# Patient Record
Sex: FEMALE | Race: WHITE | NOT HISPANIC OR LATINO | Employment: FULL TIME | RURAL
[De-identification: names, ages, dates, MRNs, and addresses within clinical notes are randomized per-mention and may not be internally consistent; named-entity substitution may affect disease eponyms.]

---

## 2018-06-26 ENCOUNTER — HISTORICAL (OUTPATIENT)
Dept: ADMINISTRATIVE | Facility: HOSPITAL | Age: 58
End: 2018-06-26

## 2018-06-29 LAB
LAB AP CLINICAL INFORMATION: NORMAL
LAB AP COMMENTS: NORMAL
LAB AP DIAGNOSIS - HISTORICAL: NORMAL
LAB AP GROSS PATHOLOGY - HISTORICAL: NORMAL
LAB AP SPECIMEN SUBMITTED - HISTORICAL: NORMAL

## 2018-09-28 ENCOUNTER — LAB VISIT (OUTPATIENT)
Dept: LAB | Facility: HOSPITAL | Age: 58
End: 2018-09-28
Attending: FAMILY MEDICINE
Payer: COMMERCIAL

## 2018-09-28 ENCOUNTER — OFFICE VISIT (OUTPATIENT)
Dept: INTERNAL MEDICINE | Facility: CLINIC | Age: 58
End: 2018-09-28
Payer: COMMERCIAL

## 2018-09-28 VITALS
HEIGHT: 63 IN | HEART RATE: 64 BPM | BODY MASS INDEX: 21.71 KG/M2 | OXYGEN SATURATION: 99 % | TEMPERATURE: 97 F | DIASTOLIC BLOOD PRESSURE: 64 MMHG | RESPIRATION RATE: 18 BRPM | WEIGHT: 122.56 LBS | SYSTOLIC BLOOD PRESSURE: 130 MMHG

## 2018-09-28 DIAGNOSIS — K57.30 DIVERTICULOSIS OF LARGE INTESTINE WITHOUT HEMORRHAGE: ICD-10-CM

## 2018-09-28 DIAGNOSIS — K57.30 DIVERTICULOSIS OF LARGE INTESTINE WITHOUT HEMORRHAGE: Primary | ICD-10-CM

## 2018-09-28 DIAGNOSIS — R11.0 NAUSEA: ICD-10-CM

## 2018-09-28 PROBLEM — R10.30 LOWER ABDOMINAL PAIN: Status: ACTIVE | Noted: 2018-09-28

## 2018-09-28 PROBLEM — R30.0 DYSURIA: Status: ACTIVE | Noted: 2018-09-28

## 2018-09-28 PROBLEM — R10.30 LOWER ABDOMINAL PAIN: Status: RESOLVED | Noted: 2018-09-28 | Resolved: 2018-09-28

## 2018-09-28 LAB
ALBUMIN SERPL BCP-MCNC: 3.6 G/DL
ALP SERPL-CCNC: 44 U/L
ALT SERPL W/O P-5'-P-CCNC: 22 U/L
ANION GAP SERPL CALC-SCNC: 9 MMOL/L
AST SERPL-CCNC: 17 U/L
BASOPHILS # BLD AUTO: 0.02 K/UL
BASOPHILS NFR BLD: 0.2 %
BILIRUB SERPL-MCNC: 0.5 MG/DL
BILIRUB SERPL-MCNC: ABNORMAL MG/DL
BLOOD URINE, POC: ABNORMAL
BUN SERPL-MCNC: 14 MG/DL
CALCIUM SERPL-MCNC: 9.4 MG/DL
CHLORIDE SERPL-SCNC: 105 MMOL/L
CO2 SERPL-SCNC: 27 MMOL/L
COLOR, POC UA: ABNORMAL
CREAT SERPL-MCNC: 0.7 MG/DL
DIFFERENTIAL METHOD: ABNORMAL
EOSINOPHIL # BLD AUTO: 0.1 K/UL
EOSINOPHIL NFR BLD: 0.7 %
ERYTHROCYTE [DISTWIDTH] IN BLOOD BY AUTOMATED COUNT: 11.7 %
EST. GFR  (AFRICAN AMERICAN): >60 ML/MIN/1.73 M^2
EST. GFR  (NON AFRICAN AMERICAN): >60 ML/MIN/1.73 M^2
GLUCOSE SERPL-MCNC: 111 MG/DL
GLUCOSE UR QL STRIP: NORMAL
HCT VFR BLD AUTO: 39.8 %
HGB BLD-MCNC: 13.9 G/DL
KETONES UR QL STRIP: ABNORMAL
LEUKOCYTE ESTERASE URINE, POC: ABNORMAL
LYMPHOCYTES # BLD AUTO: 1 K/UL
LYMPHOCYTES NFR BLD: 10.6 %
MCH RBC QN AUTO: 32.1 PG
MCHC RBC AUTO-ENTMCNC: 34.9 G/DL
MCV RBC AUTO: 92 FL
MONOCYTES # BLD AUTO: 0.7 K/UL
MONOCYTES NFR BLD: 7.7 %
NEUTROPHILS # BLD AUTO: 7.7 K/UL
NEUTROPHILS NFR BLD: 80.7 %
NITRITE, POC UA: ABNORMAL
PH, POC UA: 6
PLATELET # BLD AUTO: 233 K/UL
PMV BLD AUTO: 9.9 FL
POTASSIUM SERPL-SCNC: 3.9 MMOL/L
PROT SERPL-MCNC: 7 G/DL
PROTEIN, POC: ABNORMAL
RBC # BLD AUTO: 4.33 M/UL
SODIUM SERPL-SCNC: 141 MMOL/L
SPECIFIC GRAVITY, POC UA: 1.02
UROBILINOGEN, POC UA: NORMAL
WBC # BLD AUTO: 9.51 K/UL

## 2018-09-28 PROCEDURE — 85025 COMPLETE CBC W/AUTO DIFF WBC: CPT | Mod: PO

## 2018-09-28 PROCEDURE — 81002 URINALYSIS NONAUTO W/O SCOPE: CPT | Mod: S$GLB,,, | Performed by: FAMILY MEDICINE

## 2018-09-28 PROCEDURE — 99204 OFFICE O/P NEW MOD 45 MIN: CPT | Mod: 25,S$GLB,, | Performed by: FAMILY MEDICINE

## 2018-09-28 PROCEDURE — 99999 PR PBB SHADOW E&M-NEW PATIENT-LVL III: CPT | Mod: PBBFAC,,, | Performed by: FAMILY MEDICINE

## 2018-09-28 PROCEDURE — 36415 COLL VENOUS BLD VENIPUNCTURE: CPT | Mod: PO

## 2018-09-28 PROCEDURE — 80053 COMPREHEN METABOLIC PANEL: CPT | Mod: PO

## 2018-09-28 RX ORDER — ESTRADIOL 2 MG/1
TABLET ORAL
COMMUNITY
Start: 2018-08-04 | End: 2023-12-15 | Stop reason: DRUGHIGH

## 2018-09-28 RX ORDER — ONDANSETRON 4 MG/1
4 TABLET, FILM COATED ORAL EVERY 8 HOURS PRN
Qty: 30 TABLET | Refills: 0 | Status: SHIPPED | OUTPATIENT
Start: 2018-09-28 | End: 2023-12-15

## 2018-09-28 RX ORDER — CIPROFLOXACIN 500 MG/1
500 TABLET ORAL EVERY 12 HOURS
Qty: 10 TABLET | Refills: 0 | Status: SHIPPED | OUTPATIENT
Start: 2018-09-28 | End: 2021-12-27 | Stop reason: ALTCHOICE

## 2018-09-28 RX ORDER — ATORVASTATIN CALCIUM 10 MG/1
TABLET, FILM COATED ORAL
COMMUNITY
Start: 2018-07-18

## 2018-09-28 RX ORDER — CETIRIZINE HYDROCHLORIDE 5 MG/1
5 TABLET ORAL DAILY
COMMUNITY

## 2018-09-28 RX ORDER — DICYCLOMINE HYDROCHLORIDE 20 MG/1
20 TABLET ORAL EVERY 6 HOURS
Qty: 120 TABLET | Refills: 0 | Status: SHIPPED | OUTPATIENT
Start: 2018-09-28 | End: 2018-10-28

## 2018-09-28 RX ORDER — MONTELUKAST SODIUM 10 MG/1
TABLET ORAL
COMMUNITY
Start: 2018-07-10

## 2018-09-28 NOTE — ASSESSMENT & PLAN NOTE
Start abx and bentyl.  See handout for info.  F/u next week.  No imaging today, will consider at next visit.

## 2018-09-28 NOTE — PATIENT INSTRUCTIONS

## 2018-10-19 DIAGNOSIS — Z12.39 BREAST CANCER SCREENING: ICD-10-CM

## 2019-07-16 ENCOUNTER — HISTORICAL (OUTPATIENT)
Dept: ADMINISTRATIVE | Facility: HOSPITAL | Age: 59
End: 2019-07-16

## 2019-10-18 ENCOUNTER — TELEPHONE (OUTPATIENT)
Dept: ADMINISTRATIVE | Facility: HOSPITAL | Age: 59
End: 2019-10-18

## 2019-10-18 NOTE — TELEPHONE ENCOUNTER
Contacted patient to schedule annual visit with PCP Dr. Taye Cuellar; patient states that she lives in [place] and she see a PCP closer to her home. Tomasa

## 2019-12-20 DIAGNOSIS — Z12.39 BREAST CANCER SCREENING: ICD-10-CM

## 2020-05-19 ENCOUNTER — TELEPHONE (OUTPATIENT)
Dept: ADMINISTRATIVE | Facility: HOSPITAL | Age: 60
End: 2020-05-19

## 2020-07-21 ENCOUNTER — HISTORICAL (OUTPATIENT)
Dept: ADMINISTRATIVE | Facility: HOSPITAL | Age: 60
End: 2020-07-21

## 2020-07-23 LAB

## 2020-07-30 ENCOUNTER — HISTORICAL (OUTPATIENT)
Dept: ADMINISTRATIVE | Facility: HOSPITAL | Age: 60
End: 2020-07-30

## 2020-08-04 LAB
INSULIN SERPL-ACNC: NORMAL U[IU]/ML
LAB AP CLINICAL INFORMATION: NORMAL
LAB AP COMMENTS: NORMAL
LAB AP DIAGNOSIS - HISTORICAL: NORMAL
LAB AP GROSS PATHOLOGY - HISTORICAL: NORMAL
LAB AP SPECIMEN SUBMITTED - HISTORICAL: NORMAL

## 2021-07-15 NOTE — PROGRESS NOTES
Subjective:       Patient ID: Sunita Pitts is a 57 y.o. female.    Chief Complaint: Urinary Tract Infection and Establish Care    Abdominal Pain   This is a new problem. The current episode started yesterday. The onset quality is gradual. The problem occurs constantly. The problem has been gradually worsening. The pain is located in the LLQ and RLQ. The pain is moderate. Quality: tenderness, sorenes. The abdominal pain does not radiate. Associated symptoms include a fever and nausea. Pertinent negatives include no constipation or vomiting.     Review of Systems   Constitutional: Positive for fever.   HENT: Negative for congestion.    Eyes: Negative for discharge.   Respiratory: Negative for shortness of breath.    Cardiovascular: Negative for chest pain.   Gastrointestinal: Positive for abdominal pain and nausea. Negative for constipation and vomiting.   Genitourinary: Negative for difficulty urinating.   Musculoskeletal: Negative for joint swelling.   Neurological: Negative for dizziness.   Psychiatric/Behavioral: Negative for agitation.       Objective:      Physical Exam   Constitutional: She appears well-developed and well-nourished. She appears distressed.   HENT:   Head: Normocephalic and atraumatic.   Eyes: Conjunctivae are normal. No scleral icterus.   Cardiovascular: Normal rate and regular rhythm.   Pulmonary/Chest: Effort normal and breath sounds normal. No respiratory distress. She has no wheezes.   Abdominal: Soft. Bowel sounds are normal. There is tenderness in the left lower quadrant. There is no rigidity, no rebound and negative Hodge's sign.   Neurological: She is alert.   Skin: Skin is warm and dry. No rash noted. She is not diaphoretic. No erythema. No pallor.   Good turgor   Nursing note and vitals reviewed.      Assessment:       1. Diverticulosis of large intestine without hemorrhage    2. Nausea        Plan:     Problem List Items Addressed This Visit        GI    Diverticulosis of  large intestine - Primary    Current Assessment & Plan     Start abx and bentyl.  See handout for info.  F/u next week.  No imaging today, will consider at next visit.         Relevant Medications    ondansetron (ZOFRAN) 4 MG tablet    ciprofloxacin HCl (CIPRO) 500 MG tablet    dicyclomine (BENTYL) 20 mg tablet    Other Relevant Orders    POCT URINE DIPSTICK WITHOUT MICROSCOPE    CBC auto differential    Comprehensive metabolic panel    Nausea    Relevant Medications    ondansetron (ZOFRAN) 4 MG tablet         I certify as stated above.

## 2021-12-27 ENCOUNTER — OFFICE VISIT (OUTPATIENT)
Dept: FAMILY MEDICINE | Facility: CLINIC | Age: 61
End: 2021-12-27
Payer: COMMERCIAL

## 2021-12-27 VITALS
RESPIRATION RATE: 18 BRPM | HEART RATE: 75 BPM | SYSTOLIC BLOOD PRESSURE: 117 MMHG | HEIGHT: 63 IN | WEIGHT: 128.63 LBS | DIASTOLIC BLOOD PRESSURE: 61 MMHG | OXYGEN SATURATION: 96 % | BODY MASS INDEX: 22.79 KG/M2

## 2021-12-27 DIAGNOSIS — N30.91 CYSTITIS WITH HEMATURIA: Primary | ICD-10-CM

## 2021-12-27 LAB
BILIRUB SERPL-MCNC: ABNORMAL MG/DL
BLOOD URINE, POC: ABNORMAL
COLOR, POC UA: ABNORMAL
GLUCOSE UR QL STRIP: ABNORMAL
KETONES UR QL STRIP: ABNORMAL
LEUKOCYTE ESTERASE URINE, POC: ABNORMAL
NITRITE, POC UA: POSITIVE
PH, POC UA: 5.5
PROTEIN, POC: ABNORMAL
SPECIFIC GRAVITY, POC UA: 1.02
UROBILINOGEN, POC UA: 0.2

## 2021-12-27 PROCEDURE — 96372 THER/PROPH/DIAG INJ SC/IM: CPT | Mod: ,,, | Performed by: NURSE PRACTITIONER

## 2021-12-27 PROCEDURE — 96372 PR INJECTION,THERAP/PROPH/DIAG2ST, IM OR SUBCUT: ICD-10-PCS | Mod: ,,, | Performed by: NURSE PRACTITIONER

## 2021-12-27 PROCEDURE — 87086 CULTURE, URINE: ICD-10-PCS | Mod: ,,, | Performed by: CLINICAL MEDICAL LABORATORY

## 2021-12-27 PROCEDURE — 3078F PR MOST RECENT DIASTOLIC BLOOD PRESSURE < 80 MM HG: ICD-10-PCS | Mod: ,,, | Performed by: NURSE PRACTITIONER

## 2021-12-27 PROCEDURE — 3074F PR MOST RECENT SYSTOLIC BLOOD PRESSURE < 130 MM HG: ICD-10-PCS | Mod: ,,, | Performed by: NURSE PRACTITIONER

## 2021-12-27 PROCEDURE — 3078F DIAST BP <80 MM HG: CPT | Mod: ,,, | Performed by: NURSE PRACTITIONER

## 2021-12-27 PROCEDURE — 87086 URINE CULTURE/COLONY COUNT: CPT | Mod: ,,, | Performed by: CLINICAL MEDICAL LABORATORY

## 2021-12-27 PROCEDURE — 3008F BODY MASS INDEX DOCD: CPT | Mod: ,,, | Performed by: NURSE PRACTITIONER

## 2021-12-27 PROCEDURE — 99213 OFFICE O/P EST LOW 20 MIN: CPT | Mod: 25,,, | Performed by: NURSE PRACTITIONER

## 2021-12-27 PROCEDURE — 81003 POCT URINALYSIS W/O SCOPE: ICD-10-PCS | Mod: QW,,, | Performed by: NURSE PRACTITIONER

## 2021-12-27 PROCEDURE — 3008F PR BODY MASS INDEX (BMI) DOCUMENTED: ICD-10-PCS | Mod: ,,, | Performed by: NURSE PRACTITIONER

## 2021-12-27 PROCEDURE — 81003 URINALYSIS AUTO W/O SCOPE: CPT | Mod: QW,,, | Performed by: NURSE PRACTITIONER

## 2021-12-27 PROCEDURE — 1159F MED LIST DOCD IN RCRD: CPT | Mod: ,,, | Performed by: NURSE PRACTITIONER

## 2021-12-27 PROCEDURE — 3074F SYST BP LT 130 MM HG: CPT | Mod: ,,, | Performed by: NURSE PRACTITIONER

## 2021-12-27 PROCEDURE — 1159F PR MEDICATION LIST DOCUMENTED IN MEDICAL RECORD: ICD-10-PCS | Mod: ,,, | Performed by: NURSE PRACTITIONER

## 2021-12-27 PROCEDURE — 99213 PR OFFICE/OUTPT VISIT, EST, LEVL III, 20-29 MIN: ICD-10-PCS | Mod: 25,,, | Performed by: NURSE PRACTITIONER

## 2021-12-27 RX ORDER — PHENAZOPYRIDINE HYDROCHLORIDE 200 MG/1
200 TABLET, FILM COATED ORAL 3 TIMES DAILY PRN
Qty: 12 TABLET | Refills: 0 | Status: SHIPPED | OUTPATIENT
Start: 2021-12-27 | End: 2022-01-06

## 2021-12-27 RX ORDER — EPINEPHRINE 0.3 MG/.3ML
INJECTION SUBCUTANEOUS
COMMUNITY
Start: 2021-11-22

## 2021-12-27 RX ORDER — CEFTRIAXONE 1 G/1
1 INJECTION, POWDER, FOR SOLUTION INTRAMUSCULAR; INTRAVENOUS
Status: COMPLETED | OUTPATIENT
Start: 2021-12-27 | End: 2021-12-27

## 2021-12-27 RX ORDER — SULFAMETHOXAZOLE AND TRIMETHOPRIM 800; 160 MG/1; MG/1
1 TABLET ORAL 2 TIMES DAILY
Qty: 14 TABLET | Refills: 0 | Status: SHIPPED | OUTPATIENT
Start: 2021-12-27 | End: 2023-12-15

## 2021-12-27 RX ORDER — CEFTRIAXONE 1 G/1
1 INJECTION, POWDER, FOR SOLUTION INTRAMUSCULAR; INTRAVENOUS ONCE
Qty: 1 G | Refills: 0 | Status: SHIPPED | OUTPATIENT
Start: 2021-12-27 | End: 2021-12-27

## 2021-12-27 RX ADMIN — CEFTRIAXONE 1 G: 1 INJECTION, POWDER, FOR SOLUTION INTRAMUSCULAR; INTRAVENOUS at 03:12

## 2021-12-30 ENCOUNTER — TELEPHONE (OUTPATIENT)
Dept: FAMILY MEDICINE | Facility: CLINIC | Age: 61
End: 2021-12-30
Payer: COMMERCIAL

## 2021-12-30 LAB — UA COMPLETE W REFLEX CULTURE PNL UR: NORMAL

## 2021-12-30 RX ORDER — FLUCONAZOLE 150 MG/1
150 TABLET ORAL DAILY
Qty: 1 TABLET | Refills: 0 | Status: SHIPPED | OUTPATIENT
Start: 2021-12-30 | End: 2021-12-31

## 2022-10-04 ENCOUNTER — OFFICE VISIT (OUTPATIENT)
Dept: FAMILY MEDICINE | Facility: CLINIC | Age: 62
End: 2022-10-04
Payer: COMMERCIAL

## 2022-10-04 VITALS
RESPIRATION RATE: 18 BRPM | OXYGEN SATURATION: 97 % | HEIGHT: 63 IN | HEART RATE: 66 BPM | SYSTOLIC BLOOD PRESSURE: 114 MMHG | BODY MASS INDEX: 23.28 KG/M2 | DIASTOLIC BLOOD PRESSURE: 54 MMHG | WEIGHT: 131.38 LBS | TEMPERATURE: 98 F

## 2022-10-04 DIAGNOSIS — J02.9 PHARYNGITIS, UNSPECIFIED ETIOLOGY: Primary | ICD-10-CM

## 2022-10-04 DIAGNOSIS — J06.9 UPPER RESPIRATORY TRACT INFECTION, UNSPECIFIED TYPE: ICD-10-CM

## 2022-10-04 PROBLEM — N30.91 CYSTITIS WITH HEMATURIA: Status: RESOLVED | Noted: 2021-12-27 | Resolved: 2022-10-04

## 2022-10-04 PROBLEM — K57.30 DIVERTICULOSIS OF LARGE INTESTINE: Status: RESOLVED | Noted: 2018-09-28 | Resolved: 2022-10-04

## 2022-10-04 PROBLEM — R11.0 NAUSEA: Status: RESOLVED | Noted: 2018-09-28 | Resolved: 2022-10-04

## 2022-10-04 PROCEDURE — 3074F PR MOST RECENT SYSTOLIC BLOOD PRESSURE < 130 MM HG: ICD-10-PCS | Mod: ,,, | Performed by: NURSE PRACTITIONER

## 2022-10-04 PROCEDURE — 1159F PR MEDICATION LIST DOCUMENTED IN MEDICAL RECORD: ICD-10-PCS | Mod: ,,, | Performed by: NURSE PRACTITIONER

## 2022-10-04 PROCEDURE — 1159F MED LIST DOCD IN RCRD: CPT | Mod: ,,, | Performed by: NURSE PRACTITIONER

## 2022-10-04 PROCEDURE — 3078F DIAST BP <80 MM HG: CPT | Mod: ,,, | Performed by: NURSE PRACTITIONER

## 2022-10-04 PROCEDURE — 3008F PR BODY MASS INDEX (BMI) DOCUMENTED: ICD-10-PCS | Mod: ,,, | Performed by: NURSE PRACTITIONER

## 2022-10-04 PROCEDURE — 96372 PR INJECTION,THERAP/PROPH/DIAG2ST, IM OR SUBCUT: ICD-10-PCS | Mod: ,,, | Performed by: NURSE PRACTITIONER

## 2022-10-04 PROCEDURE — 3008F BODY MASS INDEX DOCD: CPT | Mod: ,,, | Performed by: NURSE PRACTITIONER

## 2022-10-04 PROCEDURE — 3074F SYST BP LT 130 MM HG: CPT | Mod: ,,, | Performed by: NURSE PRACTITIONER

## 2022-10-04 PROCEDURE — 3078F PR MOST RECENT DIASTOLIC BLOOD PRESSURE < 80 MM HG: ICD-10-PCS | Mod: ,,, | Performed by: NURSE PRACTITIONER

## 2022-10-04 PROCEDURE — 96372 THER/PROPH/DIAG INJ SC/IM: CPT | Mod: ,,, | Performed by: NURSE PRACTITIONER

## 2022-10-04 PROCEDURE — 99213 OFFICE O/P EST LOW 20 MIN: CPT | Mod: 25,,, | Performed by: NURSE PRACTITIONER

## 2022-10-04 PROCEDURE — 99213 PR OFFICE/OUTPT VISIT, EST, LEVL III, 20-29 MIN: ICD-10-PCS | Mod: 25,,, | Performed by: NURSE PRACTITIONER

## 2022-10-04 RX ORDER — AMOXICILLIN 875 MG/1
875 TABLET, FILM COATED ORAL EVERY 12 HOURS
Qty: 20 TABLET | Refills: 0 | Status: SHIPPED | OUTPATIENT
Start: 2022-10-04 | End: 2023-12-15

## 2022-10-04 RX ORDER — CEFTRIAXONE 1 G/1
1 INJECTION, POWDER, FOR SOLUTION INTRAMUSCULAR; INTRAVENOUS
Status: COMPLETED | OUTPATIENT
Start: 2022-10-04 | End: 2022-10-04

## 2022-10-04 RX ORDER — DEXAMETHASONE SODIUM PHOSPHATE 4 MG/ML
4 INJECTION, SOLUTION INTRA-ARTICULAR; INTRALESIONAL; INTRAMUSCULAR; INTRAVENOUS; SOFT TISSUE
Status: COMPLETED | OUTPATIENT
Start: 2022-10-04 | End: 2022-10-04

## 2022-10-04 RX ADMIN — DEXAMETHASONE SODIUM PHOSPHATE 4 MG: 4 INJECTION, SOLUTION INTRA-ARTICULAR; INTRALESIONAL; INTRAMUSCULAR; INTRAVENOUS; SOFT TISSUE at 04:10

## 2022-10-04 RX ADMIN — CEFTRIAXONE 1 G: 1 INJECTION, POWDER, FOR SOLUTION INTRAMUSCULAR; INTRAVENOUS at 04:10

## 2022-10-04 NOTE — PROGRESS NOTES
AURE Gann   [facility]  [address]   [place] [address]     PATIENT NAME: Sunita Pitts  : 1960  DATE: 10/4/22  MRN: [id number]      Billing Provider: AURE Gann  Level of Service:   Patient PCP Information       Provider PCP Type    Primary Doctor No General            Reason for Visit / Chief Complaint: Sore Throat, Generalized Body Aches, and Headache (Headache, body aches and sore throat began hurting last hs.  has also been sick. Feels bad and reports the last time she felt this bad that it was strep throat)       Update PCP  Update Chief Complaint         History of Present Illness / Problem Focused Workflow     Sunita Pitts is a 61 y.o. female presents to the clinic  with complaint  of sore throat, hoarseness, headache, achy all over. No fever or chills.  Exposed  to URI by       Review of Systems     Review of Systems   Constitutional:  Positive for fatigue.   HENT:  Positive for nasal congestion and sore throat.    Respiratory:  Negative for cough, chest tightness and shortness of breath.    Cardiovascular:  Negative for chest pain, palpitations and leg swelling.   Gastrointestinal:  Negative for nausea, vomiting and reflux.   Neurological:  Negative for weakness and memory loss.   Psychiatric/Behavioral:  Negative for confusion and sleep disturbance.       Medical / Social / Family History     Past Medical History:   Diagnosis Date    Hyperlipidemia        Past Surgical History:   Procedure Laterality Date    BREAST BIOPSY      HYSTERECTOMY      TONSILLECTOMY         Social History  Ms.  reports that she has never smoked. She has been exposed to tobacco smoke. She has never used smokeless tobacco. She reports that she does not drink alcohol and does not use drugs.    Family History  Ms.'s family history includes Breast cancer in her mother; Diabetes in her father; Hypertension in her father; Parkinsonism in her father.    Medications and Allergies  "    Medications  Outpatient Medications Marked as Taking for the 10/4/22 encounter (Office Visit) with AURE Gann   Medication Sig Dispense Refill    atorvastatin (LIPITOR) 10 MG tablet       cetirizine (ZYRTEC) 5 MG tablet Take 5 mg by mouth once daily.      EPINEPHrine (EPIPEN) 0.3 mg/0.3 mL AtIn INJECT 0.3ML INTRAMUSCULARLY ONCE AS NEEDED FOR ANAPHYLAXIS      estradiol (ESTRACE) 2 MG tablet          Allergies  Review of patient's allergies indicates:  No Known Allergies    Physical Examination     Vitals:    10/04/22 1323   BP: (!) 114/54   BP Location: Left arm   Patient Position: Sitting   BP Method: Large (Automatic)   Pulse: 66   Resp: 18   Temp: 98.1 °F (36.7 °C)   TempSrc: Oral   SpO2: 97%   Weight: 59.6 kg (131 lb 6.4 oz)   Height: 5' 3" (1.6 m)      Physical Exam  Constitutional:       Appearance: Normal appearance.   HENT:      Right Ear: Tympanic membrane normal.      Left Ear: Tympanic membrane normal.      Nose: Congestion present.      Mouth/Throat:      Pharynx: Posterior oropharyngeal erythema present. No oropharyngeal exudate.   Cardiovascular:      Rate and Rhythm: Normal rate and regular rhythm.      Pulses: Normal pulses.      Heart sounds: Normal heart sounds.   Pulmonary:      Effort: Pulmonary effort is normal.      Breath sounds: Normal breath sounds.   Lymphadenopathy:      Cervical: No cervical adenopathy.   Skin:     General: Skin is warm and dry.   Neurological:      Mental Status: She is alert.        Assessment and Plan (including Health Maintenance)      Problem List  Smart Sets  Document Outside HM   :    Plan: rocephin gm 1 IM and Decadron 4mg IM now,  Rx amoxil, ed a hist, drink lots of fluid, tylenol as needed.        Health Maintenance Due   Topic Date Due    Hepatitis C Screening  Never done    HIV Screening  Never done    TETANUS VACCINE  Never done    Mammogram  Never done    Colorectal Cancer Screening  Never done    Shingles Vaccine (1 of 2) Never done       Problem " List Items Addressed This Visit    None    There are no diagnoses linked to this encounter.   Health Maintenance Topics with due status: Not Due       Topic Last Completion Date    Lipid Panel 04/12/2018       Procedures     No future appointments.     No follow-ups on file.     Signature:  AURE Gann    Date of encounter: 10/4/22

## 2022-10-07 ENCOUNTER — TELEPHONE (OUTPATIENT)
Dept: FAMILY MEDICINE | Facility: CLINIC | Age: 62
End: 2022-10-07
Payer: COMMERCIAL

## 2022-10-07 RX ORDER — FLUCONAZOLE 150 MG/1
150 TABLET ORAL DAILY
Qty: 1 TABLET | Refills: 0 | Status: SHIPPED | OUTPATIENT
Start: 2022-10-07 | End: 2022-10-08

## 2022-12-16 ENCOUNTER — OFFICE VISIT (OUTPATIENT)
Dept: FAMILY MEDICINE | Facility: CLINIC | Age: 62
End: 2022-12-16
Payer: COMMERCIAL

## 2022-12-16 VITALS
SYSTOLIC BLOOD PRESSURE: 126 MMHG | BODY MASS INDEX: 23.71 KG/M2 | WEIGHT: 133.81 LBS | DIASTOLIC BLOOD PRESSURE: 72 MMHG | RESPIRATION RATE: 20 BRPM | HEART RATE: 66 BPM | HEIGHT: 63 IN | OXYGEN SATURATION: 97 % | TEMPERATURE: 98 F

## 2022-12-16 DIAGNOSIS — J11.1 INFLUENZA-LIKE ILLNESS: Primary | ICD-10-CM

## 2022-12-16 PROCEDURE — 99213 OFFICE O/P EST LOW 20 MIN: CPT | Mod: ,,, | Performed by: NURSE PRACTITIONER

## 2022-12-16 PROCEDURE — 3074F PR MOST RECENT SYSTOLIC BLOOD PRESSURE < 130 MM HG: ICD-10-PCS | Mod: ,,, | Performed by: NURSE PRACTITIONER

## 2022-12-16 PROCEDURE — 3074F SYST BP LT 130 MM HG: CPT | Mod: ,,, | Performed by: NURSE PRACTITIONER

## 2022-12-16 PROCEDURE — 1159F PR MEDICATION LIST DOCUMENTED IN MEDICAL RECORD: ICD-10-PCS | Mod: ,,, | Performed by: NURSE PRACTITIONER

## 2022-12-16 PROCEDURE — 3008F BODY MASS INDEX DOCD: CPT | Mod: ,,, | Performed by: NURSE PRACTITIONER

## 2022-12-16 PROCEDURE — 3078F PR MOST RECENT DIASTOLIC BLOOD PRESSURE < 80 MM HG: ICD-10-PCS | Mod: ,,, | Performed by: NURSE PRACTITIONER

## 2022-12-16 PROCEDURE — 3078F DIAST BP <80 MM HG: CPT | Mod: ,,, | Performed by: NURSE PRACTITIONER

## 2022-12-16 PROCEDURE — 99213 PR OFFICE/OUTPT VISIT, EST, LEVL III, 20-29 MIN: ICD-10-PCS | Mod: ,,, | Performed by: NURSE PRACTITIONER

## 2022-12-16 PROCEDURE — 3008F PR BODY MASS INDEX (BMI) DOCUMENTED: ICD-10-PCS | Mod: ,,, | Performed by: NURSE PRACTITIONER

## 2022-12-16 PROCEDURE — 1159F MED LIST DOCD IN RCRD: CPT | Mod: ,,, | Performed by: NURSE PRACTITIONER

## 2022-12-16 RX ORDER — OSELTAMIVIR PHOSPHATE 75 MG/1
75 CAPSULE ORAL 2 TIMES DAILY
Qty: 10 CAPSULE | Refills: 0 | Status: SHIPPED | OUTPATIENT
Start: 2022-12-16 | End: 2022-12-21

## 2022-12-16 RX ORDER — ESTRADIOL 1 MG/1
1 TABLET ORAL
COMMUNITY
Start: 2022-11-06

## 2022-12-16 NOTE — PROGRESS NOTES
AURE Gann   [facility]  [address]   [place] [address]     PATIENT NAME: Sunita Pitts  : 1960  DATE: 22  MRN: [id number]      Billing Provider: AURE Gann  Level of Service:   Patient PCP Information       Provider PCP Type    Primary Doctor No General            Reason for Visit / Chief Complaint: No chief complaint on file.       Update PCP  Update Chief Complaint         History of Present Illness / Problem Focused Workflow     Sunita Pitts is a 61 y.o. female presents to the clinic  with onset of head congestion, sore throat and cough.  No fever noted as yet.  Her coworker tested positive for covid , flu and strep today.  Pt did home covid test and was negative.       Review of Systems     Review of Systems   Constitutional:  Positive for fatigue. Negative for chills and fever.   HENT:  Positive for nasal congestion and sore throat.    Respiratory:  Positive for cough. Negative for chest tightness and shortness of breath.    Cardiovascular:  Negative for chest pain, palpitations and leg swelling.   Gastrointestinal:  Negative for nausea, vomiting and reflux.   Neurological:  Negative for weakness and memory loss.   Psychiatric/Behavioral:  Negative for confusion and sleep disturbance.       Medical / Social / Family History     Past Medical History:   Diagnosis Date    Hyperlipidemia        Past Surgical History:   Procedure Laterality Date    BREAST BIOPSY      HYSTERECTOMY      TONSILLECTOMY         Social History  Ms.  reports that she has never smoked. She has been exposed to tobacco smoke. She has never used smokeless tobacco. She reports that she does not drink alcohol and does not use drugs.    Family History  Ms.'s family history includes Breast cancer in her mother; Diabetes in her father; Hypertension in her father; Parkinsonism in her father.    Medications and Allergies     Medications  No outpatient medications have been marked as taking for the  12/16/22 encounter (Office Visit) with AURE Gann.       Allergies  Review of patient's allergies indicates:  No Known Allergies    Physical Examination   There were no vitals filed for this visit.   Physical Exam  Constitutional:       Appearance: Normal appearance.   HENT:      Right Ear: Tympanic membrane normal.      Left Ear: Tympanic membrane normal.      Nose: Congestion present.      Mouth/Throat:      Pharynx: Posterior oropharyngeal erythema present. No oropharyngeal exudate.   Cardiovascular:      Rate and Rhythm: Normal rate and regular rhythm.      Pulses: Normal pulses.      Heart sounds: Normal heart sounds.   Pulmonary:      Effort: Pulmonary effort is normal.      Breath sounds: Normal breath sounds.   Skin:     General: Skin is warm and dry.   Neurological:      Mental Status: She is alert and oriented to person, place, and time.        Assessment and Plan (including Health Maintenance)      Problem List  Smart Sets  Document Outside HM   :    Plan:  will get  tests today and treat  as needed.  All negative, will start Tamiflu  75mg dialy for prevention.  Continue with Ann-Marie hist , drink lots of fluids and if worsens call back.  She will recheck Covid test  and if positive, consider  Paxlovid.          Health Maintenance Due   Topic Date Due    Hepatitis C Screening  Never done    HIV Screening  Never done    TETANUS VACCINE  Never done    Mammogram  Never done    Colorectal Cancer Screening  Never done    Shingles Vaccine (1 of 2) Never done       Problem List Items Addressed This Visit    None    There are no diagnoses linked to this encounter.   Health Maintenance Topics with due status: Not Due       Topic Last Completion Date    Lipid Panel 04/12/2018       Procedures     No future appointments.     No follow-ups on file.     Signature:  AURE Gann    Date of encounter: 12/16/22

## 2022-12-16 NOTE — PATIENT INSTRUCTIONS
will get  tests today and treat  as needed.  All negative, will start Tamiflu  75mg dialy for prevention.  Continue with Ann-Marie hist , drink lots of fluids and if worsens call back.  She will recheck Covid test  and if positive, consider  Paxlovid.

## 2022-12-16 NOTE — LETTER
December 16, 2022      [facility] [place]  [address]  [place] [address]  Phone: [phone]  Fax: [phone]       Patient: Sunita Pitts   YOB: 1960  Date of Visit: 12/16/2022    To Whom It May Concern:    Cristina Pitts  was at [facility] on 12/16/2022. The patient may return to work/school on 12/19/22  with no restrictions, if symptoms have resolved. If you have any questions or concerns, or if I can be of further assistance, please do not hesitate to contact me.    Sincerely,    AURE Gann

## 2023-10-26 PROCEDURE — 88305 PATHOLOGY, DERMATOLOGY: ICD-10-PCS | Mod: 26,,, | Performed by: PATHOLOGY

## 2023-10-26 PROCEDURE — 88312 SPECIAL STAINS GROUP 1: CPT | Mod: 26,,, | Performed by: PATHOLOGY

## 2023-10-26 PROCEDURE — 88312 PATHOLOGY, DERMATOLOGY: ICD-10-PCS | Mod: 26,,, | Performed by: PATHOLOGY

## 2023-10-26 PROCEDURE — 88305 TISSUE EXAM BY PATHOLOGIST: CPT | Mod: 26,,, | Performed by: PATHOLOGY

## 2023-10-26 PROCEDURE — 88305 TISSUE EXAM BY PATHOLOGIST: CPT | Mod: TC,SUR | Performed by: DERMATOLOGY

## 2023-10-27 ENCOUNTER — LAB REQUISITION (OUTPATIENT)
Dept: LAB | Facility: HOSPITAL | Age: 63
End: 2023-10-27
Attending: DERMATOLOGY
Payer: COMMERCIAL

## 2023-10-27 DIAGNOSIS — D49.2 NEOPLASM OF UNSPECIFIED BEHAVIOR OF BONE, SOFT TISSUE, AND SKIN: ICD-10-CM

## 2023-10-30 LAB
ESTROGEN SERPL-MCNC: NORMAL PG/ML
INSULIN SERPL-ACNC: NORMAL U[IU]/ML
LAB AP GROSS DESCRIPTION: NORMAL
LAB AP LABORATORY NOTES: NORMAL
LAB AP SPEC A DDX: NORMAL
LAB AP SPEC A MORPHOLOGY: NORMAL
LAB AP SPEC A PROCEDURE: NORMAL
T3RU NFR SERPL: NORMAL %

## 2023-12-15 ENCOUNTER — OFFICE VISIT (OUTPATIENT)
Dept: FAMILY MEDICINE | Facility: CLINIC | Age: 63
End: 2023-12-15
Payer: COMMERCIAL

## 2023-12-15 VITALS
TEMPERATURE: 98 F | BODY MASS INDEX: 23.32 KG/M2 | DIASTOLIC BLOOD PRESSURE: 62 MMHG | OXYGEN SATURATION: 95 % | HEART RATE: 69 BPM | HEIGHT: 63 IN | WEIGHT: 131.63 LBS | SYSTOLIC BLOOD PRESSURE: 110 MMHG | RESPIRATION RATE: 18 BRPM

## 2023-12-15 DIAGNOSIS — J11.1 INFLUENZA-LIKE ILLNESS: Primary | ICD-10-CM

## 2023-12-15 DIAGNOSIS — R05.1 ACUTE COUGH: ICD-10-CM

## 2023-12-15 LAB
CTP QC/QA: YES
FLUAV AG NPH QL: NEGATIVE
FLUBV AG NPH QL: NEGATIVE

## 2023-12-15 PROCEDURE — 3078F DIAST BP <80 MM HG: CPT | Mod: ,,, | Performed by: NURSE PRACTITIONER

## 2023-12-15 PROCEDURE — 3008F BODY MASS INDEX DOCD: CPT | Mod: ,,, | Performed by: NURSE PRACTITIONER

## 2023-12-15 PROCEDURE — 99213 OFFICE O/P EST LOW 20 MIN: CPT | Mod: ,,, | Performed by: NURSE PRACTITIONER

## 2023-12-15 PROCEDURE — 3078F PR MOST RECENT DIASTOLIC BLOOD PRESSURE < 80 MM HG: ICD-10-PCS | Mod: ,,, | Performed by: NURSE PRACTITIONER

## 2023-12-15 PROCEDURE — 3074F PR MOST RECENT SYSTOLIC BLOOD PRESSURE < 130 MM HG: ICD-10-PCS | Mod: ,,, | Performed by: NURSE PRACTITIONER

## 2023-12-15 PROCEDURE — 3074F SYST BP LT 130 MM HG: CPT | Mod: ,,, | Performed by: NURSE PRACTITIONER

## 2023-12-15 PROCEDURE — 87804 INFLUENZA ASSAY W/OPTIC: CPT | Mod: 59,QW,, | Performed by: NURSE PRACTITIONER

## 2023-12-15 PROCEDURE — 3008F PR BODY MASS INDEX (BMI) DOCUMENTED: ICD-10-PCS | Mod: ,,, | Performed by: NURSE PRACTITIONER

## 2023-12-15 PROCEDURE — 1159F PR MEDICATION LIST DOCUMENTED IN MEDICAL RECORD: ICD-10-PCS | Mod: ,,, | Performed by: NURSE PRACTITIONER

## 2023-12-15 PROCEDURE — 1159F MED LIST DOCD IN RCRD: CPT | Mod: ,,, | Performed by: NURSE PRACTITIONER

## 2023-12-15 PROCEDURE — 99213 PR OFFICE/OUTPT VISIT, EST, LEVL III, 20-29 MIN: ICD-10-PCS | Mod: ,,, | Performed by: NURSE PRACTITIONER

## 2023-12-15 PROCEDURE — 87804 POCT INFLUENZA A/B: ICD-10-PCS | Mod: 59,QW,, | Performed by: NURSE PRACTITIONER

## 2023-12-15 RX ORDER — PROMETHAZINE HYDROCHLORIDE AND DEXTROMETHORPHAN HYDROBROMIDE 6.25; 15 MG/5ML; MG/5ML
5 SYRUP ORAL EVERY 4 HOURS PRN
Qty: 150 ML | Refills: 0 | Status: SHIPPED | OUTPATIENT
Start: 2023-12-15 | End: 2023-12-25

## 2023-12-15 RX ORDER — OSELTAMIVIR PHOSPHATE 75 MG/1
75 CAPSULE ORAL 2 TIMES DAILY
Qty: 10 CAPSULE | Refills: 0 | Status: SHIPPED | OUTPATIENT
Start: 2023-12-15 | End: 2023-12-20

## 2023-12-15 NOTE — LETTER
December 15, 2023      [facility] [address] [place] [address]  Phone: [phone]  Fax: [phone]       Patient: Sunita Pitts   YOB: 1960  Date of Visit: 12/15/2023    To Whom It May Concern:    Cristina Pitts  was at [facility] on 12/15/2023. The patient may return to work/school on 12/18/23 if no fever  with no restrictions. If you have any questions or concerns, or if I can be of further assistance, please do not hesitate to contact me.    Sincerely,    AURE Gann

## 2023-12-15 NOTE — PROGRESS NOTES
AURE Gann   [facility]  [address]   [place] [address]     PATIENT NAME: Sunita Pitts  : 1960  DATE: 12/15/23  MRN: [id number]      Billing Provider: AURE Gann  Level of Service:   Patient PCP Information       Provider PCP Type    Primary Doctor No General            Reason for Visit / Chief Complaint: Fever, Generalized Body Aches, and Cough (Started coughing yesterday, fever and chills last night. Reports that her neck is also sore along with body aches)         History of Present Illness / Problem Focused Workflow     Sunita Pitts is a 62 y.o. female presents to the clinic  with onset yesterday with cough, fever and chills and now has body aches and  has been exposed to flu at work.      Review of Systems     Review of Systems   Constitutional:  Positive for chills and fever. Negative for fatigue.   HENT:  Positive for rhinorrhea. Negative for nasal congestion and sore throat.    Respiratory:  Positive for cough. Negative for chest tightness and shortness of breath.    Cardiovascular:  Negative for chest pain, palpitations and leg swelling.   Gastrointestinal:  Negative for nausea, vomiting and reflux.   Musculoskeletal:  Positive for myalgias.   Neurological:  Negative for weakness and memory loss.   Psychiatric/Behavioral:  Negative for confusion and sleep disturbance.         Medical / Social / Family History     Past Medical History:   Diagnosis Date    Hyperlipidemia        Past Surgical History:   Procedure Laterality Date    BREAST BIOPSY      HYSTERECTOMY      TONSILLECTOMY         Social History  Ms.  reports that she has never smoked. She has been exposed to tobacco smoke. She has never used smokeless tobacco. She reports that she does not drink alcohol and does not use drugs.    Family History  Ms.'s family history includes Breast cancer in her mother; Diabetes in her father; Hypertension in her father; Parkinsonism in her father.    Medications and  "Allergies     Medications  Outpatient Medications Marked as Taking for the 12/15/23 encounter (Office Visit) with Roym Siddiqui FNP   Medication Sig Dispense Refill    atorvastatin (LIPITOR) 10 MG tablet       cetirizine (ZYRTEC) 5 MG tablet Take 5 mg by mouth once daily.      EPINEPHrine (EPIPEN) 0.3 mg/0.3 mL AtIn INJECT 0.3ML INTRAMUSCULARLY ONCE AS NEEDED FOR ANAPHYLAXIS      estradioL (ESTRACE) 1 MG tablet Take 1 mg by mouth.      montelukast (SINGULAIR) 10 mg tablet          Allergies  Review of patient's allergies indicates:  No Known Allergies    Physical Examination     Vitals:    12/15/23 0738   BP: 110/62   BP Location: Right arm   Patient Position: Sitting   BP Method: Large (Automatic)   Pulse: 69   Resp: 18   Temp: 98.4 °F (36.9 °C)   TempSrc: Oral   SpO2: 95%   Weight: 59.7 kg (131 lb 9.6 oz)   Height: 5' 3" (1.6 m)      Physical Exam  Constitutional:       Appearance: Normal appearance.   HENT:      Right Ear: Tympanic membrane, ear canal and external ear normal.      Left Ear: Tympanic membrane, ear canal and external ear normal.      Nose: Nose normal.      Mouth/Throat:      Pharynx: Posterior oropharyngeal erythema (minimal) present.   Cardiovascular:      Rate and Rhythm: Normal rate and regular rhythm.      Heart sounds: Normal heart sounds.   Pulmonary:      Effort: Pulmonary effort is normal.      Breath sounds: Normal breath sounds.   Skin:     General: Skin is warm and dry.   Neurological:      Mental Status: She is alert and oriented to person, place, and time.          Assessment and Plan (including Health Maintenance)     :    Plan:  rapid flu is negative. Pt declines covid test today. Will go ahead and rx tamiflu 75mg bid  #10, promethazine dm 1-2 tsp qid for cough and congestion (150ml).  Drink lots of fluids, fever meds as needed.   Work excuse        Health Maintenance Due   Topic Date Due    Hepatitis C Screening  Never done    COVID-19 Vaccine (1) Never done    HIV Screening  Never " done    TETANUS VACCINE  Never done    Mammogram  Never done    Colorectal Cancer Screening  Never done    Shingles Vaccine (1 of 2) Never done    RSV Vaccine (Age 60+ and Pregnant patients) (1 - 1-dose 60+ series) Never done    Lipid Panel  04/12/2023    Influenza Vaccine (1) 09/01/2023       Problem List Items Addressed This Visit    None  Visit Diagnoses       Acute cough    -  Primary        .  Acute cough  -     POCT Influenza A/B       The patient has no Health Maintenance topics of status Not Due    Procedures     No future appointments.     No follow-ups on file.     Signature:  AURE Gann    Date of encounter: 12/15/23

## 2024-09-12 ENCOUNTER — TELEPHONE (OUTPATIENT)
Dept: FAMILY MEDICINE | Facility: CLINIC | Age: 64
End: 2024-09-12
Payer: COMMERCIAL

## 2024-09-12 RX ORDER — MOXIFLOXACIN 5 MG/ML
1 SOLUTION/ DROPS OPHTHALMIC 3 TIMES DAILY
Qty: 3 ML | Refills: 0 | Status: SHIPPED | OUTPATIENT
Start: 2024-09-12

## 2024-09-12 NOTE — TELEPHONE ENCOUNTER
Pt's right eye is red, draining, swollen and scratchy and would like something for pink eye sent to her pharmacy.

## 2024-09-20 ENCOUNTER — OFFICE VISIT (OUTPATIENT)
Dept: FAMILY MEDICINE | Facility: CLINIC | Age: 64
End: 2024-09-20
Payer: COMMERCIAL

## 2024-09-20 VITALS
TEMPERATURE: 98 F | HEIGHT: 63 IN | OXYGEN SATURATION: 97 % | HEART RATE: 90 BPM | WEIGHT: 129.19 LBS | RESPIRATION RATE: 18 BRPM | BODY MASS INDEX: 22.89 KG/M2 | SYSTOLIC BLOOD PRESSURE: 109 MMHG | DIASTOLIC BLOOD PRESSURE: 66 MMHG

## 2024-09-20 DIAGNOSIS — T78.3XXS ALLERGIC ANGIOEDEMA, SEQUELA: ICD-10-CM

## 2024-09-20 DIAGNOSIS — J06.9 UPPER RESPIRATORY TRACT INFECTION, UNSPECIFIED TYPE: Primary | ICD-10-CM

## 2024-09-20 RX ORDER — CEFTRIAXONE 1 G/1
1 INJECTION, POWDER, FOR SOLUTION INTRAMUSCULAR; INTRAVENOUS
Status: COMPLETED | OUTPATIENT
Start: 2024-09-20 | End: 2024-09-20

## 2024-09-20 RX ORDER — PROMETHAZINE HYDROCHLORIDE AND DEXTROMETHORPHAN HYDROBROMIDE 6.25; 15 MG/5ML; MG/5ML
5 SYRUP ORAL EVERY 4 HOURS PRN
Qty: 125 ML | Refills: 0 | Status: SHIPPED | OUTPATIENT
Start: 2024-09-20 | End: 2024-09-30

## 2024-09-20 RX ORDER — EPINEPHRINE 0.3 MG/.3ML
1 INJECTION SUBCUTANEOUS ONCE
Qty: 1 EACH | Refills: 2 | Status: SHIPPED | OUTPATIENT
Start: 2024-09-20 | End: 2024-09-20

## 2024-09-20 RX ORDER — DEXAMETHASONE SODIUM PHOSPHATE 4 MG/ML
4 INJECTION, SOLUTION INTRA-ARTICULAR; INTRALESIONAL; INTRAMUSCULAR; INTRAVENOUS; SOFT TISSUE
Status: COMPLETED | OUTPATIENT
Start: 2024-09-20 | End: 2024-09-20

## 2024-09-20 RX ORDER — DOXYCYCLINE HYCLATE 100 MG
100 TABLET ORAL 2 TIMES DAILY
Qty: 14 TABLET | Refills: 0 | Status: SHIPPED | OUTPATIENT
Start: 2024-09-20

## 2024-09-20 RX ADMIN — CEFTRIAXONE 1 G: 1 INJECTION, POWDER, FOR SOLUTION INTRAMUSCULAR; INTRAVENOUS at 08:09

## 2024-09-20 RX ADMIN — DEXAMETHASONE SODIUM PHOSPHATE 4 MG: 4 INJECTION, SOLUTION INTRA-ARTICULAR; INTRALESIONAL; INTRAMUSCULAR; INTRAVENOUS; SOFT TISSUE at 08:09

## 2024-09-20 NOTE — PROGRESS NOTES
NEW CLINIC NOTE    Sunita Pitts is a 63 y.o. White female     Chief Complaint   Patient presents with    Cough    Fatigue    Headache     X 1 week began coughing, nasal congestion (green), headache and fatigue.        SUBJECTIVE  Pt presents to clinic today with a  5 day history of sinus congestion, sore throat, dry cough, runny nose. Denies known fever but has had a few body aches. Has been taking mucinex DM OTC with minimal relief.        Current Outpatient Medications on File Prior to Visit   Medication Sig Dispense Refill    atorvastatin (LIPITOR) 10 MG tablet       cetirizine (ZYRTEC) 5 MG tablet Take 5 mg by mouth once daily.      estradioL (ESTRACE) 1 MG tablet Take 1 mg by mouth.      montelukast (SINGULAIR) 10 mg tablet       [DISCONTINUED] EPINEPHrine (EPIPEN) 0.3 mg/0.3 mL AtIn INJECT 0.3ML INTRAMUSCULARLY ONCE AS NEEDED FOR ANAPHYLAXIS      moxifloxacin (VIGAMOX) 0.5 % ophthalmic solution Place 1 drop into the right eye 3 (three) times daily. 3 mL 0     No current facility-administered medications on file prior to visit.      Review of patient's allergies indicates:  No Known Allergies     Past Medical History:   Diagnosis Date    Hyperlipidemia       Past Surgical History:   Procedure Laterality Date    BREAST BIOPSY      HYSTERECTOMY      TONSILLECTOMY       Family History   Problem Relation Name Age of Onset    Breast cancer Mother      Diabetes Father      Parkinsonism Father      Hypertension Father       Social History     Socioeconomic History    Marital status:      Spouse name: Arnoldo    Number of children: 3   Tobacco Use    Smoking status: Never     Passive exposure: Past    Smokeless tobacco: Never   Substance and Sexual Activity    Alcohol use: No    Drug use: No        Lab Results   Component Value Date    WBC 9.51 09/28/2018    HGB 13.9 09/28/2018    HCT 39.8 09/28/2018    MCV 92 09/28/2018     09/28/2018        CMP  Sodium   Date Value Ref Range Status   09/28/2018 141  "136 - 145 mmol/L Final     Potassium   Date Value Ref Range Status   09/28/2018 3.9 3.5 - 5.1 mmol/L Final     Chloride   Date Value Ref Range Status   09/28/2018 105 95 - 110 mmol/L Final     CO2   Date Value Ref Range Status   09/28/2018 27 23 - 29 mmol/L Final     Glucose   Date Value Ref Range Status   09/28/2018 111 (H) 70 - 110 mg/dL Final     BUN   Date Value Ref Range Status   09/28/2018 14 6 - 20 mg/dL Final     Creatinine   Date Value Ref Range Status   09/28/2018 0.7 0.5 - 1.4 mg/dL Final     Calcium   Date Value Ref Range Status   09/28/2018 9.4 8.7 - 10.5 mg/dL Final     Total Protein   Date Value Ref Range Status   09/28/2018 7.0 6.0 - 8.4 g/dL Final     Albumin   Date Value Ref Range Status   09/28/2018 3.6 3.5 - 5.2 g/dL Final     Total Bilirubin   Date Value Ref Range Status   09/28/2018 0.5 0.1 - 1.0 mg/dL Final     Comment:     For infants and newborns, interpretation of results should be based  on gestational age, weight and in agreement with clinical  observations.  Premature Infant recommended reference ranges:  Up to 24 hours.............<8.0 mg/dL  Up to 48 hours............<12.0 mg/dL  3-5 days..................<15.0 mg/dL  6-29 days.................<15.0 mg/dL       Alkaline Phosphatase   Date Value Ref Range Status   09/28/2018 44 (L) 55 - 135 U/L Final     AST   Date Value Ref Range Status   09/28/2018 17 10 - 40 U/L Final     ALT   Date Value Ref Range Status   09/28/2018 22 10 - 44 U/L Final     Anion Gap   Date Value Ref Range Status   09/28/2018 9 8 - 16 mmol/L Final     eGFR if    Date Value Ref Range Status   09/28/2018 >60.0 >60 mL/min/1.73 m^2 Final     eGFR if non    Date Value Ref Range Status   09/28/2018 >60.0 >60 mL/min/1.73 m^2 Final     Comment:     Calculation used to obtain the estimated glomerular filtration  rate (eGFR) is the CKD-EPI equation.        No results found for: "LABA1C", "HGBA1C"      OBJECTIVE  /66   Pulse 90   Temp " "98.1 °F (36.7 °C) (Oral)   Resp 18   Ht 5' 3" (1.6 m)   Wt 58.6 kg (129 lb 3.2 oz)   SpO2 97%   BMI 22.89 kg/m²      Physical Exam  Vitals and nursing note reviewed.   Constitutional:       Appearance: Normal appearance. She is normal weight.   HENT:      Head: Normocephalic.      Nose: Congestion and rhinorrhea present.      Mouth/Throat:      Mouth: Mucous membranes are moist.      Pharynx: Posterior oropharyngeal erythema present. No oropharyngeal exudate.   Cardiovascular:      Rate and Rhythm: Normal rate and regular rhythm.      Pulses: Normal pulses.      Heart sounds: Normal heart sounds.   Pulmonary:      Effort: Pulmonary effort is normal.      Breath sounds: Normal breath sounds.      Comments: Dry cough noted during exam.  Abdominal:      General: Abdomen is flat.   Musculoskeletal:         General: Normal range of motion.      Cervical back: Normal range of motion and neck supple.   Skin:     General: Skin is warm.      Capillary Refill: Capillary refill takes less than 2 seconds.   Neurological:      Mental Status: She is alert. Mental status is at baseline.   Psychiatric:         Behavior: Behavior normal.            ASSESSMENT & PLAN  1. Upper respiratory tract infection, unspecified type    2. Allergic angioedema, sequela         Problem List Items Addressed This Visit          ENT    Upper respiratory tract infection - Primary    Overview     Inj today  Po antibiotics  Cough elixir prn         Relevant Medications    cefTRIAXone injection 1 g (Completed)    dexAMETHasone injection 4 mg (Completed)    promethazine-dextromethorphan (PROMETHAZINE-DM) 6.25-15 mg/5 mL Syrp    doxycycline (VIBRA-TABS) 100 MG tablet     Other Visit Diagnoses       Allergic angioedema, sequela        Relevant Medications    EPINEPHrine (EPIPEN) 0.3 mg/0.3 mL AtIn            Follow up in about 1 week (around 9/27/2024), or if symptoms worsen or fail to improve.     "

## 2025-03-25 ENCOUNTER — OFFICE VISIT (OUTPATIENT)
Dept: FAMILY MEDICINE | Facility: CLINIC | Age: 65
End: 2025-03-25
Payer: COMMERCIAL

## 2025-03-25 DIAGNOSIS — I82.402 ACUTE DEEP VEIN THROMBOSIS (DVT) OF LEFT LOWER EXTREMITY, UNSPECIFIED VEIN: Primary | ICD-10-CM

## 2025-03-25 PROBLEM — J06.9 UPPER RESPIRATORY TRACT INFECTION: Status: RESOLVED | Noted: 2024-09-20 | Resolved: 2025-03-25

## 2025-03-25 PROBLEM — J11.1 INFLUENZA-LIKE ILLNESS: Status: RESOLVED | Noted: 2022-12-16 | Resolved: 2025-03-25

## 2025-03-25 PROCEDURE — 99214 OFFICE O/P EST MOD 30 MIN: CPT | Mod: ,,, | Performed by: NURSE PRACTITIONER

## 2025-03-25 PROCEDURE — 1159F MED LIST DOCD IN RCRD: CPT | Mod: CPTII,,, | Performed by: NURSE PRACTITIONER

## 2025-03-25 RX ORDER — ACETAMINOPHEN 500 MG
5000 TABLET ORAL DAILY
COMMUNITY
Start: 2020-01-23

## 2025-03-25 NOTE — PROGRESS NOTES
AURE Gann   [facility]  [address]   [place] [address]     PATIENT NAME: Sunita Pitts  : 1960  DATE: 3/25/25  MRN: [id number]      Billing Provider: AURE Gann  Level of Service: [place] OFFICE/OUTPT VISIT, EST, LEVL IV, 30-39 MIN  Patient PCP Information       Provider PCP Type    Primary Doctor No General              Reason for Visit / Chief Complaint: Leg Pain and Knee Pain (X 1 Month Left leg edema and Left knee pain, behind the knee. Pt works from home and sits at a computer all day.)       History of Present Illness / Problem Focused Workflow     History of Present Illness    CHIEF COMPLAINT:  Patient presents today for left leg swelling behind the knee    LOWER EXTREMITY SYMPTOMS:  She reports swelling in left leg from knee down that started approximately one month ago, noting this is a new symptom without prior history of leg swelling. The pain location has shifted from initial presentation. She has been self-managing symptoms with compression socks.    MUSCULOSKELETAL:  She experiences twinges around the shoulder and neck region while in sedentary positions, specifically while sitting or lying in bed.    GASTROINTESTINAL:  She recently experienced severe abdominal pain after eating fried food at a restaurant, which woke her early the next morning. Her stomach remains sore and tender with bloating and excessive belching. She denies diarrhea but reports chronic constipation, which she manages with protein shakes. Previous trials of stool softeners were ineffective. She is attempting to increase water intake. She has a history of diverticulitis with one flare-up seven years ago, presenting with similar pain in the same abdominal area, which was treated with antibiotics including Flagyl.    MEDICAL HISTORY:  Fatty liver was noted on labs in summer of last year. Last colonoscopy was performed many years ago.    CURRENT MEDICATIONS:  She is currently taking Pepcid and has  recently started probiotics.      ROS:  General: -fever, -chills, -fatigue, -weight gain, -weight loss  Eyes: -vision changes, -redness, -discharge  ENT: -ear pain, -nasal congestion, -sore throat  Cardiovascular: +chest pain, -palpitations, +lower extremity edema, +neck pain  Respiratory: -cough, -shortness of breath  Gastrointestinal: +abdominal pain, -nausea, -vomiting, -diarrhea, +constipation, -blood in stool, +indigestion, +excessive belching  Genitourinary: -dysuria, -hematuria, -frequency  Musculoskeletal: -joint pain, -muscle pain, +limb swelling, +limb pain  Skin: -rash, -lesion  Neurological: -headache, -dizziness, -numbness, -tingling  Psychiatric: +anxiety, -depression, -sleep difficulty           Medical / Social / Family History     Past Medical History:   Diagnosis Date    Hyperlipidemia        Past Surgical History:   Procedure Laterality Date    BREAST BIOPSY      HYSTERECTOMY      TONSILLECTOMY         Social History  Ms.  reports that she has never smoked. She has been exposed to tobacco smoke. She has never used smokeless tobacco. She reports that she does not drink alcohol and does not use drugs.    Family History  Ms.'s family history includes Breast cancer in her mother; Diabetes in her father; Hypertension in her father; Parkinsonism in her father.    Medications and Allergies     Medications  Outpatient Medications Marked as Taking for the 3/25/25 encounter (Office Visit) with Romy Siddiqui FNP   Medication Sig Dispense Refill    atorvastatin (LIPITOR) 10 MG tablet       cetirizine (ZYRTEC) 5 MG tablet Take 5 mg by mouth once daily.      cholecalciferol, vitamin D3, (VITAMIN D3) 125 mcg (5,000 unit) Tab Take 5,000 Units by mouth once daily.      estradioL (ESTRACE) 1 MG tablet Take 1 mg by mouth.      montelukast (SINGULAIR) 10 mg tablet          Allergies  Review of patient's allergies indicates:  No Known Allergies    Physical Examination     There were no vitals filed for this visit.      Physical Exam    General: No acute distress. Well-developed. Well-nourished.  Eyes: EOMI. Sclerae anicteric.  HENT: Normocephalic. Atraumatic. Nares patent. Moist oral mucosa.  Ears: Bilateral TMs clear. Bilateral EACs clear.  Cardiovascular: Regular rate. Regular rhythm. No murmurs. No rubs. No gallops. Normal S1, S2.  Respiratory: Normal respiratory effort. Clear to auscultation bilaterally. No rales. No rhonchi. No wheezing.  Abdomen: Soft. Tenderness in the left upper quadrant. Tenderness toward the periumbilical area. Non-distended. Normoactive bowel sounds. Tenderness in the left mid quadrant.  Musculoskeletal: No  obvious deformity. Tenderness in the posterior left popliteal area. Mild swelling in the left lower leg. Discomfort with walking and sitting.  Extremities: No lower extremity edema.  Neurological: Alert & oriented x3. No slurred speech. Normal gait.  Psychiatric: Normal mood. Normal affect. Good insight. Good judgment.  Skin: Warm. Dry. No rash.       Physical Exam     Assessment and Plan (including Health Maintenance)     :Assessment & Plan    M79.89 Other specified soft tissue disorders  M25.50 Pain in unspecified joint  M54.2 Cervicalgia  M25.519 Pain in unspecified shoulder  K29.70 Gastritis, unspecified, without bleeding  K57.92 Diverticulitis of intestine, part unspecified, without perforation or abscess without bleeding  K76.0 Fatty (change of) liver, not elsewhere classified  K59.00 Constipation, unspecified    IMPRESSION:  - Considered DVT (DVT) and Baker's cyst as potential causes for left leg swelling.  - Evaluated for possible diverticulitis due to left lower quadrant abdominal pain.  - Assessed for potential cardiac issues due to reported chest pain, though not associated with activity or shortness of breath.  - Considered gastritis or reflux as cause of upper abdominal pain, particularly after recent consumption of fried foods.  - Discussed possibility of EGD, colonoscopy, and H.  pylori testing for further evaluation of GI symptoms.    LEFT LEG SWELLING AND PAIN:  - Evaluated the patient's left leg swelling, which has been present for about a month, starting from the knee down.  - Noted that the swelling was worse last week but has improved slightly.  - Performed physical exam, revealing tenderness in the posterior left popliteal area with mild swelling and minimal swelling in the left lower leg.  - Observed good pulses, with no discoloration or warmth noted.  - Documented patient's report of discomfort with walking and sitting.  - Considered the possibility of Baker's cyst or blood clot as the cause of swelling.  - Ordered ultrasound of left lower extremity veins to rule out blood clot and evaluate for Baker's cyst.  - Noted patient's report of pain in left leg, initially in the knee area and now behind the knee.  - Performed exam, eliciting soreness when pressure was applied to the affected area.  - Observed no pain with leg straightening or foot movement.  - Ordered venous US of left leg at [facility]    NECK AND SHOULDER PAIN:  - Documented patient's report of twinges of pain around shoulder and neck area.  - Acknowledged patient's neck pain and inquired about associated activities.  - Noted patient's report of twinges of pain around shoulder area.  - Acknowledged patient's shoulder pain and inquired about associated activities.    GASTRITIS:  - Recommend avoiding proton pump inhibitors (e.g., Nexium, Protonix, Prilosec) until determining appropriate diagnostic approach.  - Increased Pepcid OTC 20 mg from daily to twice daily.  - Documented patient's report of severe abdominal pain, soreness, tenderness, bloating, and belching after consuming fried food.  - Performed abdominal exam, revealing significant tenderness in left upper quadrant, left mid quadrant, and somewhat toward the periumbilical area.  - Noted no tenderness in right upper quadrant or right lower quadrant.  - Considered  the possibility of reflux or gallbladder issues.  - Discussed the need for further testing if symptoms persist.  - Advised patient to take Pepcid 20mg twice daily, avoid spicy foods for a few days, and monitor symptoms.  - Patient to avoid spicy and greasy foods for a few days.    DIVERTICULITIS:  - Prescribed ibuprofen for a few days to address potential mild diverticulitis, with caution due to gastritis concerns.  - Noted patient's history of diverticulitis diagnosed in [place] about 5 years ago.  - Performed abdominal exam, revealing significant tenderness   in left upper quadrant, left mid quadrant, and somewhat toward the periumbilical area.  - Considered the possibility of mild diverticulitis and discussed newer treatment guidelines.  - Advised patient to take ibuprofen for a few days to alleviate left lower quadrant pain, possibly due to mild diverticulitis.    FATTY LIVER:  - Noted patient's report of previous lab work showing fatty liver.  - Discussed with patient the potential contribution of excessive sweet consumption to fatty liver condition.    CONSTIPATION:  - Documented patient's report of chronic constipation.  - Noted that patient manages constipation by consuming protein drinks daily, which helps maintain regularity.  - Recorded that stool softeners were ineffective for the patient's constipation management.    FOLLOW-UP:  - Contact the office for any further chest pain for additional workup.  - Follow up when US results are available, expected to be the following morning.             Problem List Items Addressed This Visit    None  Visit Diagnoses         Acute deep vein thrombosis (DVT) of left lower extremity, unspecified vein    -  Primary        .      Health Maintenance Due   Topic Date Due    Hepatitis C Screening  Never done    HIV Screening  Never done    TETANUS VACCINE  Never done    Colorectal Cancer Screening  Never done    Shingles Vaccine (1 of 2) Never done    Pneumococcal  Vaccines (Age 50+) (1 of 1 - PCV) Never done    RSV Vaccine (Age 60+ and Pregnant patients) (1 - Risk 60-74 years 1-dose series) Never done     Health Maintenance Topics with due status: Not Due       Topic Last Completion Date    Lipid Panel 07/24/2024    Mammogram 07/31/2024       Procedures     No future appointments.     No follow-ups on file.     Signature:  AURE Gann    Date of encounter: 3/25/25      This note was generated with the assistance of ambient listening technology. Verbal consent was obtained by the patient and accompanying visitor(s) for the recording of patient appointment to facilitate this note. I attest to having reviewed and edited the generated note for accuracy, though some syntax or spelling errors may persist. Please contact the author of this note for any clarification.

## 2025-03-25 NOTE — PROGRESS NOTES
AURE Gann   [facility]  [address]   [place] [address]     PATIENT NAME: Sunita Pitts  : 1960  DATE: 3/25/25  MRN: [id number]      Billing Provider: AURE Gann  Level of Service:   Patient PCP Information       Provider PCP Type    Primary Doctor No General              Reason for Visit / Chief Complaint: Leg Pain and Knee Pain (X 1 Month Left leg edema and Left knee pain, behind the knee. Pt works from home and sits at a computer all day.)       History of Present Illness / Problem Focused Workflow     History of Present Illness    CHIEF COMPLAINT:  Patient presents today for left leg swelling behind the knee    LOWER EXTREMITY SYMPTOMS:  She reports swelling in left leg from knee down that started approximately one month ago, noting this is a new symptom without prior history of leg swelling. The pain location has shifted from initial presentation. She has been self-managing symptoms with compression socks.    MUSCULOSKELETAL:  She experiences twinges around the shoulder and neck region while in sedentary positions, specifically while sitting or lying in bed.    GASTROINTESTINAL:  She recently experienced severe abdominal pain after eating fried food at a restaurant, which woke her early the next morning. Her stomach remains sore and tender with bloating and excessive belching. She denies diarrhea but reports chronic constipation, which she manages with protein shakes. Previous trials of stool softeners were ineffective. She is attempting to increase water intake. She has a history of diverticulitis with one flare-up seven years ago, presenting with similar pain in the same abdominal area, which was treated with antibiotics including Flagyl.    MEDICAL HISTORY:  Fatty liver was noted on labs in summer of last year. Last colonoscopy was performed many years ago.    CURRENT MEDICATIONS:  She is currently taking Pepcid and has recently started probiotics.      ROS:  General:  -fever, -chills, -fatigue, -weight gain, -weight loss  Eyes: -vision changes, -redness, -discharge  ENT: -ear pain, -nasal congestion, -sore throat  Cardiovascular: +chest pain, -palpitations, +lower extremity edema, +neck pain  Respiratory: -cough, -shortness of breath  Gastrointestinal: +abdominal pain, -nausea, -vomiting, -diarrhea, +constipation, -blood in stool, +indigestion, +excessive belching  Genitourinary: -dysuria, -hematuria, -frequency  Musculoskeletal: -joint pain, -muscle pain, +limb swelling, +limb pain  Skin: -rash, -lesion  Neurological: -headache, -dizziness, -numbness, -tingling  Psychiatric: +anxiety, -depression, -sleep difficulty           Medical / Social / Family History     Past Medical History:   Diagnosis Date    Hyperlipidemia        Past Surgical History:   Procedure Laterality Date    BREAST BIOPSY      HYSTERECTOMY      TONSILLECTOMY         Social History  Ms.  reports that she has never smoked. She has been exposed to tobacco smoke. She has never used smokeless tobacco. She reports that she does not drink alcohol and does not use drugs.    Family History  Ms.'s family history includes Breast cancer in her mother; Diabetes in her father; Hypertension in her father; Parkinsonism in her father.    Medications and Allergies     Medications  Outpatient Medications Marked as Taking for the 3/25/25 encounter (Office Visit) with Romy Siddiqui FNP   Medication Sig Dispense Refill    atorvastatin (LIPITOR) 10 MG tablet       cetirizine (ZYRTEC) 5 MG tablet Take 5 mg by mouth once daily.      cholecalciferol, vitamin D3, (VITAMIN D3) 125 mcg (5,000 unit) Tab Take 5,000 Units by mouth once daily.      estradioL (ESTRACE) 1 MG tablet Take 1 mg by mouth.      montelukast (SINGULAIR) 10 mg tablet          Allergies  Review of patient's allergies indicates:  No Known Allergies    Physical Examination     There were no vitals filed for this visit.     Physical Exam    General: No acute distress.  Well-developed. Well-nourished.  Eyes: EOMI. Sclerae anicteric.  HENT: Normocephalic. Atraumatic. Nares patent. Moist oral mucosa.  Ears: Bilateral TMs clear. Bilateral EACs clear.  Cardiovascular: Regular rate. Regular rhythm. No murmurs. No rubs. No gallops. Normal S1, S2.  Respiratory: Normal respiratory effort. Clear to auscultation bilaterally. No rales. No rhonchi. No wheezing.  Abdomen: Soft. Tenderness in the left upper quadrant. Tenderness toward the periumbilical area. Non-distended. Normoactive bowel sounds. Tenderness in the left mid quadrant.  Musculoskeletal: No  obvious deformity. Tenderness in the posterior left popliteal area. Mild swelling in the left lower leg. Discomfort with walking and sitting.  Extremities: No lower extremity edema.  Neurological: Alert & oriented x3. No slurred speech. Normal gait.  Psychiatric: Normal mood. Normal affect. Good insight. Good judgment.  Skin: Warm. Dry. No rash.       Physical Exam     Assessment and Plan (including Health Maintenance)     :Assessment & Plan    M79.89 Other specified soft tissue disorders  M25.50 Pain in unspecified joint  M54.2 Cervicalgia  M25.519 Pain in unspecified shoulder  K29.70 Gastritis, unspecified, without bleeding  K57.92 Diverticulitis of intestine, part unspecified, without perforation or abscess without bleeding  K76.0 Fatty (change of) liver, not elsewhere classified  K59.00 Constipation, unspecified    IMPRESSION:  - Considered DVT (DVT) and Baker's cyst as potential causes for left leg swelling.  - Evaluated for possible diverticulitis due to left lower quadrant abdominal pain.  - Assessed for potential cardiac issues due to reported chest pain, though not associated with activity or shortness of breath.  - Considered gastritis or reflux as cause of upper abdominal pain, particularly after recent consumption of fried foods.  - Discussed possibility of EGD, colonoscopy, and H. pylori testing for further evaluation of GI  symptoms.    LEFT LEG SWELLING AND PAIN:  - Evaluated the patient's left leg swelling, which has been present for about a month, starting from the knee down.  - Noted that the swelling was worse last week but has improved slightly.  - Performed physical exam, revealing tenderness in the posterior left popliteal area with mild swelling and minimal swelling in the left lower leg.  - Observed good pulses, with no discoloration or warmth noted.  - Documented patient's report of discomfort with walking and sitting.  - Considered the possibility of Baker's cyst or blood clot as the cause of swelling.  - Ordered ultrasound of left lower extremity veins to rule out blood clot and evaluate for Baker's cyst.  - Noted patient's report of pain in left leg, initially in the knee area and now behind the knee.  - Performed exam, eliciting soreness when pressure was applied to the affected area.  - Observed no pain with leg straightening or foot movement.  - Ordered venous US of left leg at [facility] today.    NECK AND SHOULDER PAIN:  - Documented patient's report of twinges of pain around shoulder and neck area.  - Acknowledged patient's neck pain and inquired about associated activities.  - Noted patient's report of twinges of pain around shoulder area.  - Acknowledged patient's shoulder pain and inquired about associated activities.    GASTRITIS:  - Recommend avoiding proton pump inhibitors (e.g., Nexium, Protonix, Prilosec) until determining appropriate diagnostic approach.  - Increased Pepcid OTC 20 mg from daily to twice daily.  - Documented patient's report of severe abdominal pain, soreness, tenderness, bloating, and belching after consuming fried food.  - Performed abdominal exam, revealing significant tenderness in left upper quadrant, left mid quadrant, and somewhat toward the periumbilical area.  - Noted no tenderness in right upper quadrant or right lower quadrant.  - Considered the possibility of reflux or  gallbladder issues.  - Discussed the need for further testing if symptoms persist.  - Advised patient to take Pepcid 20mg twice daily, avoid spicy foods for a few days, and monitor symptoms.  - Patient to avoid spicy and greasy foods for a few days.    DIVERTICULITIS:  - Prescribed ibuprofen for a few days to address potential mild diverticulitis, with caution due to gastritis concerns.  - Noted patient's history of diverticulitis diagnosed in [place] about 5 years ago.  - Performed abdominal exam, revealing significant tenderness   in left upper quadrant, left mid quadrant, and somewhat toward the periumbilical area.  - Considered the possibility of mild diverticulitis and discussed newer treatment guidelines.  - Advised patient to take ibuprofen for a few days to alleviate left lower quadrant pain, possibly due to mild diverticulitis.    FATTY LIVER:  - Noted patient's report of previous lab work showing fatty liver.  - Discussed with patient the potential contribution of excessive sweet consumption to fatty liver condition.    CONSTIPATION:  - Documented patient's report of chronic constipation.  - Noted that patient manages constipation by consuming protein drinks daily, which helps maintain regularity.  - Recorded that stool softeners were ineffective for the patient's constipation management.    FOLLOW-UP:  - Contact the office for any further chest pain for additional workup.  - Follow up when US results are available, expected to be the following morning.             Problem List Items Addressed This Visit    None  Visit Diagnoses         Acute deep vein thrombosis (DVT) of left lower extremity, unspecified vein    -  Primary        .      Health Maintenance Due   Topic Date Due    Hepatitis C Screening  Never done    HIV Screening  Never done    TETANUS VACCINE  Never done    Colorectal Cancer Screening  Never done    Shingles Vaccine (1 of 2) Never done    Pneumococcal Vaccines (Age 50+) (1 of 1 - PCV)  Never done     Health Maintenance Topics with due status: Not Due       Topic Last Completion Date    Lipid Panel 07/24/2024    Mammogram 07/31/2024    RSV Vaccine (Age 60+ and Pregnant patients) Not Due       Procedures     No future appointments.     No follow-ups on file.     Signature:  AURE Gann    Date of encounter: 3/25/25      This note was generated with the assistance of ambient listening technology. Verbal consent was obtained by the patient and accompanying visitor(s) for the recording of patient appointment to facilitate this note. I attest to having reviewed and edited the generated note for accuracy, though some syntax or spelling errors may persist. Please contact the author of this note for any clarification.

## 2025-03-27 ENCOUNTER — OFFICE VISIT (OUTPATIENT)
Dept: FAMILY MEDICINE | Facility: CLINIC | Age: 65
End: 2025-03-27
Payer: COMMERCIAL

## 2025-03-27 VITALS
TEMPERATURE: 99 F | SYSTOLIC BLOOD PRESSURE: 100 MMHG | RESPIRATION RATE: 18 BRPM | HEART RATE: 89 BPM | WEIGHT: 133.81 LBS | DIASTOLIC BLOOD PRESSURE: 63 MMHG | OXYGEN SATURATION: 98 % | HEIGHT: 63 IN | BODY MASS INDEX: 23.71 KG/M2

## 2025-03-27 DIAGNOSIS — R10.30 LOWER ABDOMINAL PAIN: ICD-10-CM

## 2025-03-27 DIAGNOSIS — K57.32 DIVERTICULITIS OF LARGE INTESTINE WITHOUT PERFORATION OR ABSCESS, UNSPECIFIED BLEEDING STATUS: Primary | ICD-10-CM

## 2025-03-27 LAB
BASOPHILS # BLD AUTO: 0.05 K/UL (ref 0–0.2)
BASOPHILS NFR BLD AUTO: 0.3 % (ref 0–1)
DIFFERENTIAL METHOD BLD: ABNORMAL
EOSINOPHIL # BLD AUTO: 0.2 K/UL (ref 0–0.5)
EOSINOPHIL NFR BLD AUTO: 1.2 % (ref 1–4)
ERYTHROCYTE [DISTWIDTH] IN BLOOD BY AUTOMATED COUNT: 11.4 % (ref 11.5–14.5)
HCT VFR BLD AUTO: 43.4 % (ref 38–47)
HGB BLD-MCNC: 14.4 G/DL (ref 12–16)
IMM GRANULOCYTES # BLD AUTO: 0.07 K/UL (ref 0–0.04)
IMM GRANULOCYTES NFR BLD: 0.4 % (ref 0–0.4)
LYMPHOCYTES # BLD AUTO: 1.1 K/UL (ref 1–4.8)
LYMPHOCYTES NFR BLD AUTO: 6.7 % (ref 27–41)
MCH RBC QN AUTO: 31.1 PG (ref 27–31)
MCHC RBC AUTO-ENTMCNC: 33.2 G/DL (ref 32–36)
MCV RBC AUTO: 93.7 FL (ref 80–96)
MONOCYTES # BLD AUTO: 1.19 K/UL (ref 0–0.8)
MONOCYTES NFR BLD AUTO: 7.3 % (ref 2–6)
MPC BLD CALC-MCNC: 10.8 FL (ref 9.4–12.4)
NEUTROPHILS # BLD AUTO: 13.75 K/UL (ref 1.8–7.7)
NEUTROPHILS NFR BLD AUTO: 84.1 % (ref 53–65)
NRBC # BLD AUTO: 0 X10E3/UL
NRBC, AUTO (.00): 0 %
PLATELET # BLD AUTO: 260 K/UL (ref 150–400)
RBC # BLD AUTO: 4.63 M/UL (ref 4.2–5.4)
WBC # BLD AUTO: 16.36 K/UL (ref 4.5–11)

## 2025-03-27 PROCEDURE — 96372 THER/PROPH/DIAG INJ SC/IM: CPT | Mod: ,,, | Performed by: NURSE PRACTITIONER

## 2025-03-27 PROCEDURE — 85025 COMPLETE CBC W/AUTO DIFF WBC: CPT | Mod: ,,, | Performed by: CLINICAL MEDICAL LABORATORY

## 2025-03-27 PROCEDURE — 99214 OFFICE O/P EST MOD 30 MIN: CPT | Mod: 25,,, | Performed by: NURSE PRACTITIONER

## 2025-03-27 RX ORDER — METRONIDAZOLE 500 MG/1
500 TABLET ORAL EVERY 12 HOURS
Qty: 14 TABLET | Refills: 0 | Status: SHIPPED | OUTPATIENT
Start: 2025-03-27

## 2025-03-27 RX ORDER — TRAMADOL HYDROCHLORIDE 50 MG/1
50 TABLET ORAL EVERY 6 HOURS PRN
Qty: 15 EACH | Refills: 0 | Status: SHIPPED | OUTPATIENT
Start: 2025-03-27

## 2025-03-27 RX ORDER — KETOROLAC TROMETHAMINE 30 MG/ML
30 INJECTION, SOLUTION INTRAMUSCULAR; INTRAVENOUS
Status: COMPLETED | OUTPATIENT
Start: 2025-03-27 | End: 2025-03-27

## 2025-03-27 RX ORDER — CIPROFLOXACIN 500 MG/1
500 TABLET ORAL EVERY 12 HOURS
Qty: 14 TABLET | Refills: 0 | Status: SHIPPED | OUTPATIENT
Start: 2025-03-27

## 2025-03-27 RX ADMIN — KETOROLAC TROMETHAMINE 30 MG: 30 INJECTION, SOLUTION INTRAMUSCULAR; INTRAVENOUS at 05:03

## 2025-03-27 NOTE — PROGRESS NOTES
AURE Gann   [facility]  [address]   [place] [address]     PATIENT NAME: Sunita Pitts  : 1960  DATE: 3/27/25  MRN: [id number]      Billing Provider: AURE Gann  Level of Service: MT OFFICE/OUTPT VISIT, EST, LEVL IV, 30-39 MIN  Patient PCP Information       Provider PCP Type    Primary Doctor No General              Reason for Visit / Chief Complaint: Abdominal Pain (Lower left abd pain with temp slightly elevated at home 99.3f  The pain began this past Saturday after eating fried foods. She has not had any diarrhea and doesn't remember last BM. She is belching more than usual and pain level is at a 10 at it's worse. 7-8 years ago she had a very similar problem and went to a clinic. )       History of Present Illness / Problem Focused Workflow     History of Present Illness    CHIEF COMPLAINT:  Patient presents today for abdominal pain    HISTORY OF PRESENT ILLNESS:  She developed abdominal pain  morning at 3 AM, characterized as cramping and bloating with waves of gripping pain localized to the left lower quadrant. The pain has not improved since onset. She also reports leg pain on the same side as the abdominal pain. She developed a low-grade fever this afternoon. She experiences mild nausea, which she attributes to lack of food intake and pain, without worsening symptoms. After taking Milk of Magnesia, she had a bowel movement followed by a second bowel movement containing mucus with pink-tinged blood.    DIET:  She has been primarily drinking water and intentionally reducing food intake. She has consumed scrambled eggs and Jell-O, noting no difference in symptom severity with either food.    CURRENT MEDICATIONS:  She is taking Gas-X, probiotic, pepsin, and Milk of Magnesia 15 mL in the morning. She reduced Milk of Magnesia from 30 mL due to previous stomach cramping with the higher dose.    MEDICAL HISTORY:  She has a history of diverticulitis.      ROS:  General:  +fever, -chills, -fatigue, -weight gain, -weight loss  Eyes: -vision changes, -redness, -discharge  ENT: -ear pain, -nasal congestion, -sore throat  Cardiovascular: -chest pain, -palpitations, -lower extremity edema  Respiratory: -cough, -shortness of breath  Gastrointestinal: +abdominal pain, +nausea, -vomiting, -diarrhea, -constipation, +blood in stool, +bloating, +mucous in stool, +bright red blood per rectum, +rectal bleeding  Genitourinary: -dysuria, -hematuria, -frequency  Musculoskeletal: -joint pain, -muscle pain  Skin: -rash, -lesion  Neurological: -headache, -dizziness, -numbness, -tingling  Psychiatric: -anxiety, -depression, -sleep difficulty           Medical / Social / Family History     Past Medical History:   Diagnosis Date    Hyperlipidemia        Past Surgical History:   Procedure Laterality Date    BREAST BIOPSY      HYSTERECTOMY      TONSILLECTOMY         Social History  Ms.  reports that she has never smoked. She has been exposed to tobacco smoke. She has never used smokeless tobacco. She reports that she does not drink alcohol and does not use drugs.    Family History  Ms.'s family history includes Breast cancer in her mother; Diabetes in her father; Hypertension in her father; Parkinsonism in her father.    Medications and Allergies     Medications  Outpatient Medications Marked as Taking for the 3/27/25 encounter (Office Visit) with Romy Siddiqui FNP   Medication Sig Dispense Refill    atorvastatin (LIPITOR) 10 MG tablet       cetirizine (ZYRTEC) 5 MG tablet Take 5 mg by mouth once daily.      cholecalciferol, vitamin D3, (VITAMIN D3) 125 mcg (5,000 unit) Tab Take 5,000 Units by mouth once daily.      estradioL (ESTRACE) 1 MG tablet Take 1 mg by mouth.      montelukast (SINGULAIR) 10 mg tablet        Current Facility-Administered Medications for the 3/27/25 encounter (Office Visit) with Romy Siddiqui FNP   Medication Dose Route Frequency Provider Last Rate Last Admin    [COMPLETED] ketorolac  "injection 30 mg  30 mg Intramuscular 1 time in Clinic/HOD Romy Siddiqui FNP   30 mg at 03/27/25 1712       Allergies  Review of patient's allergies indicates:  No Known Allergies    Physical Examination     Vitals:    03/27/25 1635   BP: 100/63   Pulse: 89   Resp: 18   Temp: 99.3 °F (37.4 °C)   TempSrc: Oral   SpO2: 98%   Weight: 60.7 kg (133 lb 12.8 oz)   Height: 5' 3" (1.6 m)        Physical Exam    General: appears acutely ill, very pale. . Well-developed. Well-nourished.  Eyes: EOMI. Sclerae anicteric.  HENT: Normocephalic. Atraumatic. Nares patent. Moist oral mucosa.  Ears: Bilateral TMs clear. Bilateral EACs clear.  Cardiovascular: Regular rate. Regular rhythm. No murmurs. No rubs. No gallops. Normal S1, S2.  Respiratory: Normal respiratory effort. Clear to auscultation bilaterally. No rales. No rhonchi. No wheezing.  Abdomen: Soft. Tenderness in left lower quadrant on light palpation. Tenderness LUQ. Tenderness umbilical region. No tenderness in right side of abdomen. Non-distended. Normoactive bowel sounds. Tenderness in left mid quadrant.  Musculoskeletal: No  obvious deformity.  Extremities: No lower extremity edema.  Neurological: Alert & oriented x3. No slurred speech. Normal gait.  Psychiatric: Normal mood. Normal affect. Good insight. Good judgment.  Skin: Warm. Dry. No rash.       Physical Exam     Assessment and Plan (including Health Maintenance)     :Assessment & Plan    K57.33 Diverticulitis of large intestine without perforation or abscess with bleeding  R10.32 Left lower quadrant pain  R10.12 Left upper quadrant pain  R10.33 Periumbilical pain  R50.81 Fever presenting with conditions classified elsewhere  R11.0 Nausea  K92.1 Melena  Z87.19 Personal history of other diseases of the digestive system    IMPRESSION:  - Suspect diverticulitis based on localized left lower quadrant pain, mucus in stool, and low-grade fever.  - Initiated antibiotic therapy due to presence of fever and significant " pain.    DIVERTICULITIS OF LARGE INTESTINE:  - Assessed the patient's condition based on reported symptoms and physical exam, diagnosing diverticulitis.  - Explained the nature of diverticulitis, including potential complications such as diverticular rupture.  - Discussed warning signs that would require immediate medical attention, including sudden unrelenting pain or worsening symptoms.  - Instructed the patient to go to the emergency room if pain becomes significant and unrelenting, or if there is a sudden increase in pain severity.  - Prescribed Ciprofloxacin and Metronidazole for treatment of diverticulitis.  - Ordered a CBC to assess for systemic inflammatory response.  - Recommend clear liquids and a very soft diet to manage diverticulitis.  - Prescribed Tramadol for pain management.  - Administered Toradol 30 mg for immediate pain relief.  - Instructed the patient to contact the office if pain worsens significantly or becomes constant.  - Emphasized the importance of adhering to dietary restrictions to prevent exacerbation of symptoms.  - Patient to maintain clear liquid diet initially, progressing to very soft diet as tolerated.  - Patient to avoid food particles that could exacerbate diverticular condition.  - Started Tramadol for pain management.  - Administered Toradol 30 mg injection in office for immediate pain relief.    LEFT LOWER QUADRANT PAIN:  - Evaluated the patient's left lower quadrant pain through physical exam, revealing tenderness consistent with diverticulitis.  - Assessed the left lower quadrant pain as likely due to diverticulitis.  - Addressed treatment for left lower quadrant pain through the management of diverticulitis, including antibiotics and pain medication.    LEFT UPPER QUADRANT PAIN:  - Noted the patient's previous report of pain in the left upper quadrant.    PERIUMBILICAL PAIN:  - Noted the patient's previous report of pain in the periumbilical area.    FEVER:  - Noted the  patient's report of low-grade fever in the afternoon.  - Acknowledged fever as a symptom indicating the need for antibiotics in diverticulitis treatment.  - Addressed treatment for fever through the management of diverticulitis with antibiotics.    NAUSEA:  - Noted the patient's report of mild nausea, possibly due to not eating.  - Assessed that nausea may be due to not eating or pain.  - Offered to prescribe Ondansetron for nausea if it worsens.    MELENA:  - Noted the patient's report of pink-colored mucus and blood in stool.  - Instructed the patient to seek emergency care if large amounts of blood are observed in stool.    PERSONAL HISTORY OF DIGESTIVE SYSTEM DISEASES:  - Inquired about the patient's previous history of diverticulitis.             Problem List Items Addressed This Visit       Diverticulitis of large intestine without perforation or abscess - Primary     Other Visit Diagnoses         Pain in the abdomen            .      Health Maintenance Due   Topic Date Due    Hepatitis C Screening  Never done    HIV Screening  Never done    TETANUS VACCINE  Never done    Colorectal Cancer Screening  Never done    Shingles Vaccine (1 of 2) Never done    Pneumococcal Vaccines (Age 50+) (1 of 1 - PCV) Never done    RSV Vaccine (Age 60+ and Pregnant patients) (1 - Risk 60-74 years 1-dose series) Never done     Health Maintenance Topics with due status: Not Due       Topic Last Completion Date    Lipid Panel 07/24/2024    Mammogram 07/31/2024       Procedures     No future appointments.     No follow-ups on file.     Signature:  AURE Gann    Date of encounter: 3/27/25      This note was generated with the assistance of ambient listening technology. Verbal consent was obtained by the patient and accompanying visitor(s) for the recording of patient appointment to facilitate this note. I attest to having reviewed and edited the generated note for accuracy, though some syntax or spelling errors may persist.  Please contact the author of this note for any clarification.

## 2025-03-28 ENCOUNTER — RESULTS FOLLOW-UP (OUTPATIENT)
Dept: FAMILY MEDICINE | Facility: CLINIC | Age: 65
End: 2025-03-28
Payer: COMMERCIAL

## 2025-03-28 DIAGNOSIS — D72.829 LEUKOCYTOSIS, UNSPECIFIED TYPE: ICD-10-CM

## 2025-03-28 DIAGNOSIS — R10.32 LEFT LOWER QUADRANT PAIN: Primary | ICD-10-CM

## 2025-04-08 ENCOUNTER — PATIENT MESSAGE (OUTPATIENT)
Dept: FAMILY MEDICINE | Facility: CLINIC | Age: 65
End: 2025-04-08
Payer: COMMERCIAL

## 2025-04-08 DIAGNOSIS — Z12.11 SCREENING FOR MALIGNANT NEOPLASM OF COLON: Primary | ICD-10-CM

## 2025-05-05 LAB — CRC RECOMMENDATION EXT: NORMAL

## 2025-05-09 ENCOUNTER — PATIENT OUTREACH (OUTPATIENT)
Facility: HOSPITAL | Age: 65
End: 2025-05-09
Payer: COMMERCIAL

## 2025-05-15 ENCOUNTER — OFFICE VISIT (OUTPATIENT)
Dept: FAMILY MEDICINE | Facility: CLINIC | Age: 65
End: 2025-05-15
Payer: COMMERCIAL

## 2025-05-15 VITALS
HEART RATE: 61 BPM | HEIGHT: 63 IN | DIASTOLIC BLOOD PRESSURE: 65 MMHG | BODY MASS INDEX: 23.53 KG/M2 | TEMPERATURE: 98 F | SYSTOLIC BLOOD PRESSURE: 110 MMHG | RESPIRATION RATE: 18 BRPM | OXYGEN SATURATION: 97 % | WEIGHT: 132.81 LBS

## 2025-05-15 DIAGNOSIS — U07.1 COVID: Primary | ICD-10-CM

## 2025-05-15 DIAGNOSIS — R05.1 ACUTE COUGH: ICD-10-CM

## 2025-05-15 LAB
CTP QC/QA: YES
SARS-COV-2 RDRP RESP QL NAA+PROBE: POSITIVE

## 2025-05-15 PROCEDURE — 3078F DIAST BP <80 MM HG: CPT | Mod: CPTII,,, | Performed by: NURSE PRACTITIONER

## 2025-05-15 PROCEDURE — 3008F BODY MASS INDEX DOCD: CPT | Mod: CPTII,,, | Performed by: NURSE PRACTITIONER

## 2025-05-15 PROCEDURE — 1159F MED LIST DOCD IN RCRD: CPT | Mod: CPTII,,, | Performed by: NURSE PRACTITIONER

## 2025-05-15 PROCEDURE — 99213 OFFICE O/P EST LOW 20 MIN: CPT | Mod: ,,, | Performed by: NURSE PRACTITIONER

## 2025-05-15 PROCEDURE — 3074F SYST BP LT 130 MM HG: CPT | Mod: CPTII,,, | Performed by: NURSE PRACTITIONER

## 2025-05-15 PROCEDURE — 87635 SARS-COV-2 COVID-19 AMP PRB: CPT | Mod: QW,,, | Performed by: NURSE PRACTITIONER

## 2025-05-15 RX ORDER — PROMETHAZINE HYDROCHLORIDE AND DEXTROMETHORPHAN HYDROBROMIDE 6.25; 15 MG/5ML; MG/5ML
5 SYRUP ORAL EVERY 4 HOURS PRN
Qty: 150 ML | Refills: 0 | Status: SHIPPED | OUTPATIENT
Start: 2025-05-15 | End: 2025-05-25

## 2025-05-15 NOTE — LETTER
May 15, 2025      [facility] [place] [address] [place] [address]  Phone: [phone]  Fax: [phone]       Patient: Sunita Pitts   YOB: 1960  Date of Visit: 05/15/2025    To Whom It May Concern:    Cristina Pitts  was at [facility] on 05/15/2025. The patient may return to work/school on  5/5/25  but productivity may be decreased due to covid.   If you have any questions or concerns, or if I can be of further assistance, please do not hesitate to contact me.    Sincerely,    AURE Gann

## 2025-05-15 NOTE — PROGRESS NOTES
AURE Gann   [facility]  [address]   [place] [address]     PATIENT NAME: Sunita Pitts  : 1960  DATE: 5/15/25  MRN: [id number]      Billing Provider: AURE Gann  Level of Service: SD OFFICE/OUTPT VISIT, EST, LEVL III, 20-29 MIN  Patient PCP Information       Provider PCP Type    Primary Doctor No General              Reason for Visit / Chief Complaint: Cough, Generalized Body Aches, and Fatigue (Coughing, fever and body aches began Tuesday, 2025. Last night temp was 100.1f. this past pt attended a baseball game in [place]. )       History of Present Illness / Problem Focused Workflow     History of Present Illness    CHIEF COMPLAINT:  Patient presents today with flu-like symptoms    HISTORY OF PRESENT ILLNESS:  She developed flu-like symptoms on Tuesday, initially presenting with body aches and fever. Her symptoms progressed to include sore throat (more severe in the morning, improving throughout the day), headache, and dizziness. She denies fever since last night. She is now developing nasal drip and hacking cough. Recent exposure history includes travel to [place] where she was exposed to large crowds of approximately 70,000 people.    CURRENT MEDICATIONS:  She is taking Mucinex cold and flu medication, Singulair, Certic, and cholesterol medication.    RECENT MEDICAL HISTORY:  Recent colonoscopy revealed diverticulitis and a benign ulceration that was biopsied. She was prescribed sulfasalazine for ulcer healing but self-discontinued the medication.      ROS:  General: -fever, -chills, -fatigue, -weight gain, -weight loss  Eyes: -vision changes, -redness, -discharge  ENT: -ear pain, -nasal congestion, +sore throat, +nasal discharge, +runny nose  Cardiovascular: -chest pain, -palpitations, -lower extremity edema  Respiratory: +cough, -shortness of breath  Gastrointestinal: -abdominal pain, -nausea, -vomiting, -diarrhea, -constipation, -blood in stool  Genitourinary:  "-dysuria, -hematuria, -frequency  Musculoskeletal: -joint pain, -muscle pain, +body aches  Skin: -rash, -lesion  Neurological: +headache, +dizziness, -numbness, -tingling  Psychiatric: -anxiety, -depression, -sleep difficulty  Head: +head pain, +sinus pressure           Medical / Social / Family History     Past Medical History:   Diagnosis Date    Hyperlipidemia        Past Surgical History:   Procedure Laterality Date    BREAST BIOPSY      HYSTERECTOMY      TONSILLECTOMY         Social History  Ms.  reports that she has never smoked. She has been exposed to tobacco smoke. She has never used smokeless tobacco. She reports that she does not drink alcohol and does not use drugs.    Family History  Ms.'s family history includes Breast cancer in her mother; Diabetes in her father; Hypertension in her father; Parkinsonism in her father.    Medications and Allergies     Medications  Outpatient Medications Marked as Taking for the 5/15/25 encounter (Office Visit) with Romy Siddiqui FNP   Medication Sig Dispense Refill    atorvastatin (LIPITOR) 10 MG tablet       cetirizine (ZYRTEC) 5 MG tablet Take 5 mg by mouth once daily.      cholecalciferol, vitamin D3, (VITAMIN D3) 125 mcg (5,000 unit) Tab Take 5,000 Units by mouth once daily.      estradioL (ESTRACE) 1 MG tablet Take 1 mg by mouth.      montelukast (SINGULAIR) 10 mg tablet          Allergies  Review of patient's allergies indicates:  No Known Allergies    Physical Examination     Vitals:    05/15/25 0951   BP: 110/65   Pulse: 61   Resp: 18   Temp: 98 °F (36.7 °C)   TempSrc: Oral   SpO2: 97%   Weight: 60.2 kg (132 lb 12.8 oz)   Height: 5' 3" (1.6 m)        Physical Exam    General: No acute distress. Well-developed. Well-nourished.  Eyes: EOMI. Sclerae anicteric.  HENT: Normocephalic. Atraumatic. Nares patent. Moist oral mucosa. Mild erythema in throat. Facial sinus tenderness.  Ears: Bilateral TMs clear. Bilateral EACs clear.  Cardiovascular: Regular rate. Regular " "rhythm. No murmurs. No rubs. No gallops. Normal S1, S2.  Respiratory: Normal respiratory effort. Clear to auscultation bilaterally. No rales. No rhonchi. No wheezing.  Abdomen: Soft. Non-tender. Non-distended. Normoactive bowel sounds.  Musculoskeletal: No  obvious deformity.  Extremities: No lower extremity edema.  Neurological: Alert & oriented x3. No slurred speech. Normal gait.  Psychiatric: Normal mood. Normal affect. Good insight. Good judgment.  Skin: Warm. Dry. No rash.  Neck: No cervical lymphadenopathy.       Physical Exam     Laboratory     Lab Results   Component Value Date     (H) 09/28/2018     09/28/2018    K 3.9 09/28/2018     09/28/2018    CO2 27 09/28/2018    BUN 14 09/28/2018    CREATININE 0.7 09/28/2018    CALCIUM 9.4 09/28/2018    PROT 7.0 09/28/2018    ALBUMIN 3.6 09/28/2018    BILITOT 0.5 09/28/2018    ALKPHOS 44 (L) 09/28/2018    AST 17 09/28/2018    ALT 22 09/28/2018    ANIONGAP 9 09/28/2018    ESTGFRAFRICA >60.0 09/28/2018    EGFRNONAA >60.0 09/28/2018       Lab Results   Component Value Date    WBC 16.36 (H) 03/27/2025    WBC 9.51 09/28/2018    RBC 4.63 03/27/2025    RBC 4.33 09/28/2018    HGB 14.4 03/27/2025    HGB 13.9 09/28/2018    HCT 43.4 03/27/2025    HCT 39.8 09/28/2018    MCV 93.7 03/27/2025    MCV 92 09/28/2018    RDW 11.4 (L) 03/27/2025    RDW 11.7 09/28/2018     03/27/2025     09/28/2018        No results found for: "CHOL", "TRIG", "HDL", "LDLCALC", "TOTALCHOLEST"    No results found for: "TSH"    No results found for: "HGBA1C", "ESTIMATEDAVG"     No results found for: "YUOIHDZI47"    No results found for: "[id number]"    No results found for: "PSA"    Assessment and Plan (including Health Maintenance)     :Assessment & Plan    U07.1 COVID-19    COVID-19:  - Confirmed positive  test with symptoms starting 2-3 days ago.  - Performed  and influenza swab tests.  - Explained that Paxlovid is an antiviral option that can decrease duration and " severity of illness if started early.  - Discussed potential medication interactions with Paxlovid.    FLU-LIKE SYMPTOMS:  - Patient reports flu-like symptoms since Tuesday, including body aches, fever, sore throat, headache, and dizziness, with no fever since last night.  - Examination revealed mildly red throat without exudate, no significant cervical lymphadenopathy or facial sinus tenderness, and clear lungs without wheezing or hacking cough.  - Prescribed promethazine DM for cough.  - Advised to continue Mucinex and cold/flu medication but instructed to take them separately.  - Recommend rest, adequate hydration, and vitamin supplementation to boost immune system.    GASTROINTESTINAL CONDITIONS:  - Evaluated recent colonoscopy results, noting benign ulceration and diverticulosis.  - Considered impact of recent sulfasalazine use on current immune status.             Problem List Items Addressed This Visit    None  Visit Diagnoses         COVID    -  Primary      Acute cough            .      Health Maintenance Due   Topic Date Due    Hepatitis C Screening  Never done    HIV Screening  Never done    TETANUS VACCINE  Never done    Shingles Vaccine (1 of 2) Never done    Pneumococcal Vaccines (Age 50+) (1 of 1 - PCV) Never done    RSV Vaccine (Age 60+ and Pregnant patients) (1 - Risk 60-74 years 1-dose series) Never done    Mammogram  07/31/2025     Health Maintenance Topics with due status: Not Due       Topic Last Completion Date    Lipid Panel 07/24/2024    Colorectal Cancer Screening 05/05/2025       Procedures     No future appointments.     Follow up in 3-5 days if not improving or symptoms worsen       Signature:  AURE Gann    Date of encounter: 5/15/25      This note was generated with the assistance of ambient listening technology. Verbal consent was obtained by the patient and accompanying visitor(s) for the recording of patient appointment to facilitate this note. I attest to having reviewed and  edited the generated note for accuracy, though some syntax or spelling errors may persist. Please contact the author of this note for any clarification.